# Patient Record
Sex: FEMALE | Race: WHITE | Employment: FULL TIME | URBAN - NONMETROPOLITAN AREA
[De-identification: names, ages, dates, MRNs, and addresses within clinical notes are randomized per-mention and may not be internally consistent; named-entity substitution may affect disease eponyms.]

---

## 2017-01-30 ENCOUNTER — OFFICE VISIT (OUTPATIENT)
Dept: FAMILY MEDICINE CLINIC | Age: 32
End: 2017-01-30

## 2017-01-30 VITALS
TEMPERATURE: 97.1 F | WEIGHT: 156.4 LBS | RESPIRATION RATE: 18 BRPM | SYSTOLIC BLOOD PRESSURE: 128 MMHG | DIASTOLIC BLOOD PRESSURE: 70 MMHG | BODY MASS INDEX: 28.61 KG/M2 | HEART RATE: 76 BPM

## 2017-01-30 DIAGNOSIS — J02.9 SORE THROAT: ICD-10-CM

## 2017-01-30 DIAGNOSIS — J02.8 PHARYNGITIS DUE TO OTHER ORGANISM: Primary | ICD-10-CM

## 2017-01-30 LAB
HETEROPHILE ANTIBODIES: NEGATIVE
S PYO AG THROAT QL: NORMAL

## 2017-01-30 PROCEDURE — 87880 STREP A ASSAY W/OPTIC: CPT | Performed by: FAMILY MEDICINE

## 2017-01-30 PROCEDURE — 86308 HETEROPHILE ANTIBODY SCREEN: CPT | Performed by: FAMILY MEDICINE

## 2017-01-30 PROCEDURE — 99213 OFFICE O/P EST LOW 20 MIN: CPT | Performed by: FAMILY MEDICINE

## 2017-01-30 RX ORDER — AZITHROMYCIN 250 MG/1
TABLET, FILM COATED ORAL
Qty: 1 PACKET | Refills: 0 | Status: SHIPPED | OUTPATIENT
Start: 2017-01-30 | End: 2017-02-09 | Stop reason: ALTCHOICE

## 2017-01-30 RX ORDER — METHYLPREDNISOLONE 4 MG/1
TABLET ORAL
Qty: 1 KIT | Refills: 0 | Status: SHIPPED | OUTPATIENT
Start: 2017-01-30 | End: 2017-02-05

## 2017-01-30 ASSESSMENT — PATIENT HEALTH QUESTIONNAIRE - PHQ9
SUM OF ALL RESPONSES TO PHQ QUESTIONS 1-9: 0
1. LITTLE INTEREST OR PLEASURE IN DOING THINGS: 0
2. FEELING DOWN, DEPRESSED OR HOPELESS: 0
SUM OF ALL RESPONSES TO PHQ9 QUESTIONS 1 & 2: 0

## 2017-02-09 ENCOUNTER — OFFICE VISIT (OUTPATIENT)
Dept: FAMILY MEDICINE CLINIC | Age: 32
End: 2017-02-09

## 2017-02-09 VITALS
SYSTOLIC BLOOD PRESSURE: 122 MMHG | RESPIRATION RATE: 16 BRPM | DIASTOLIC BLOOD PRESSURE: 80 MMHG | WEIGHT: 155.4 LBS | BODY MASS INDEX: 28.42 KG/M2 | HEART RATE: 74 BPM

## 2017-02-09 DIAGNOSIS — R10.13 EPIGASTRIC PAIN: ICD-10-CM

## 2017-02-09 DIAGNOSIS — R10.12 LUQ PAIN: Primary | ICD-10-CM

## 2017-02-09 DIAGNOSIS — R14.0 ABDOMINAL DISTENSION: ICD-10-CM

## 2017-02-09 PROCEDURE — 99213 OFFICE O/P EST LOW 20 MIN: CPT | Performed by: FAMILY MEDICINE

## 2017-03-22 ENCOUNTER — OFFICE VISIT (OUTPATIENT)
Dept: FAMILY MEDICINE CLINIC | Age: 32
End: 2017-03-22

## 2017-03-22 VITALS
WEIGHT: 152 LBS | SYSTOLIC BLOOD PRESSURE: 124 MMHG | OXYGEN SATURATION: 98 % | HEART RATE: 100 BPM | DIASTOLIC BLOOD PRESSURE: 82 MMHG | RESPIRATION RATE: 8 BRPM | TEMPERATURE: 98.5 F | BODY MASS INDEX: 27.8 KG/M2

## 2017-03-22 DIAGNOSIS — J02.0 STREP PHARYNGITIS: Primary | ICD-10-CM

## 2017-03-22 DIAGNOSIS — J02.9 SORE THROAT: ICD-10-CM

## 2017-03-22 LAB — S PYO AG THROAT QL: POSITIVE

## 2017-03-22 PROCEDURE — 99213 OFFICE O/P EST LOW 20 MIN: CPT | Performed by: FAMILY MEDICINE

## 2017-03-22 PROCEDURE — 87880 STREP A ASSAY W/OPTIC: CPT | Performed by: FAMILY MEDICINE

## 2017-03-22 PROCEDURE — 96372 THER/PROPH/DIAG INJ SC/IM: CPT | Performed by: FAMILY MEDICINE

## 2017-03-22 RX ORDER — CEFTRIAXONE 1 G/1
1 INJECTION, POWDER, FOR SOLUTION INTRAMUSCULAR; INTRAVENOUS ONCE
Status: COMPLETED | OUTPATIENT
Start: 2017-03-22 | End: 2017-03-22

## 2017-03-22 RX ADMIN — CEFTRIAXONE 1 G: 1 INJECTION, POWDER, FOR SOLUTION INTRAMUSCULAR; INTRAVENOUS at 11:15

## 2017-03-24 ENCOUNTER — TELEPHONE (OUTPATIENT)
Dept: FAMILY MEDICINE CLINIC | Age: 32
End: 2017-03-24

## 2017-03-24 RX ORDER — CEFDINIR 300 MG/1
300 CAPSULE ORAL 2 TIMES DAILY
Qty: 20 CAPSULE | Refills: 0 | Status: SHIPPED | OUTPATIENT
Start: 2017-03-24 | End: 2017-04-03

## 2017-07-19 ENCOUNTER — HOSPITAL ENCOUNTER (OUTPATIENT)
Age: 32
Discharge: HOME | End: 2017-07-19
Payer: COMMERCIAL

## 2017-07-19 LAB — PROGESTERONE LEVEL: 19.97 NG/ML

## 2017-07-19 PROCEDURE — 84144 ASSAY OF PROGESTERONE: CPT

## 2017-07-19 PROCEDURE — 36415 COLL VENOUS BLD VENIPUNCTURE: CPT

## 2017-08-15 ENCOUNTER — HOSPITAL ENCOUNTER (OUTPATIENT)
Age: 32
Discharge: HOME | End: 2017-08-15
Payer: COMMERCIAL

## 2017-08-15 LAB — PROGESTERONE LEVEL: 10.91 NG/ML

## 2017-08-15 PROCEDURE — 84144 ASSAY OF PROGESTERONE: CPT

## 2017-08-15 PROCEDURE — 36415 COLL VENOUS BLD VENIPUNCTURE: CPT

## 2017-09-29 ENCOUNTER — TELEPHONE (OUTPATIENT)
Dept: FAMILY MEDICINE CLINIC | Age: 32
End: 2017-09-29

## 2017-09-29 ENCOUNTER — NURSE ONLY (OUTPATIENT)
Dept: FAMILY MEDICINE CLINIC | Age: 32
End: 2017-09-29
Payer: COMMERCIAL

## 2017-09-29 VITALS — TEMPERATURE: 98.6 F

## 2017-09-29 DIAGNOSIS — J02.0 STREPTOCOCCAL SORE THROAT: ICD-10-CM

## 2017-09-29 DIAGNOSIS — J02.9 SORE THROAT: Primary | ICD-10-CM

## 2017-09-29 LAB — S PYO AG THROAT QL: POSITIVE

## 2017-09-29 PROCEDURE — 87880 STREP A ASSAY W/OPTIC: CPT | Performed by: FAMILY MEDICINE

## 2017-09-29 PROCEDURE — 96372 THER/PROPH/DIAG INJ SC/IM: CPT | Performed by: FAMILY MEDICINE

## 2017-09-29 PROCEDURE — 99211 OFF/OP EST MAY X REQ PHY/QHP: CPT | Performed by: FAMILY MEDICINE

## 2017-10-05 ENCOUNTER — TELEPHONE (OUTPATIENT)
Dept: FAMILY MEDICINE CLINIC | Age: 32
End: 2017-10-05

## 2017-10-05 RX ORDER — AMOXICILLIN 500 MG/1
500 CAPSULE ORAL 2 TIMES DAILY
Qty: 20 CAPSULE | Refills: 0 | Status: SHIPPED | OUTPATIENT
Start: 2017-10-05 | End: 2017-10-15

## 2017-10-05 NOTE — TELEPHONE ENCOUNTER
(Answered message for Dr. La Suarez while she's at nursing home)    Script for amoxicillin sent to pharmacy. Also make sure pushing fluids and can do salt water gargles several times per day and take ibuprofen or aleve for pain. Make appt if sx persist/worsen.     Call pt

## 2017-10-16 ENCOUNTER — OFFICE VISIT (OUTPATIENT)
Dept: FAMILY MEDICINE CLINIC | Age: 32
End: 2017-10-16
Payer: COMMERCIAL

## 2017-10-16 VITALS
DIASTOLIC BLOOD PRESSURE: 72 MMHG | RESPIRATION RATE: 16 BRPM | SYSTOLIC BLOOD PRESSURE: 130 MMHG | TEMPERATURE: 98.2 F | HEART RATE: 78 BPM | BODY MASS INDEX: 28.17 KG/M2 | WEIGHT: 154 LBS

## 2017-10-16 DIAGNOSIS — J40 BRONCHITIS: Primary | ICD-10-CM

## 2017-10-16 DIAGNOSIS — J01.01 ACUTE RECURRENT MAXILLARY SINUSITIS: ICD-10-CM

## 2017-10-16 PROCEDURE — 99213 OFFICE O/P EST LOW 20 MIN: CPT | Performed by: FAMILY MEDICINE

## 2017-10-16 RX ORDER — METHYLPREDNISOLONE 4 MG/1
TABLET ORAL
Qty: 1 KIT | Refills: 0 | Status: SHIPPED | OUTPATIENT
Start: 2017-10-16 | End: 2018-09-27 | Stop reason: SDUPTHER

## 2017-10-16 RX ORDER — LEVOFLOXACIN 500 MG/1
500 TABLET, FILM COATED ORAL DAILY
Qty: 10 TABLET | Refills: 0 | Status: SHIPPED | OUTPATIENT
Start: 2017-10-16 | End: 2017-10-26

## 2017-10-16 RX ORDER — FLUCONAZOLE 150 MG/1
150 TABLET ORAL DAILY
Qty: 2 TABLET | Refills: 0 | Status: SHIPPED | OUTPATIENT
Start: 2017-10-16 | End: 2017-10-18

## 2017-10-16 NOTE — PROGRESS NOTES
Subjective:      Patient ID: Rocky Odell is a 28 y.o. female. HPI  Chief Complaint   Patient presents with    Cough     completed amoxicillin yesterday 10-15-17    Sinusitis       Here for cough and congestion getting worse. She was treated twice for strep on 9/29/17 and 10/5/17 with bicillin and amoxicillin. Associated with chest congestion, vaginal yeast symptoms, and lower appetite. Tried:  Dayquil, zyrtec D, cough drops and little relief. Review of Systems  Constitutional: Negative for fever, chills, diaphoresis, activity change, appetite change and fatigue. HENT: Negative for hearing loss, ear pain, congestion, sore throat, rhinorrhea, postnasal drip and ear discharge. Eyes: Negative for photophobia and visual disturbance. Respiratory: Positive for cough, chest tightness, shortness of breath and wheezing. Cardiovascular: Negative for chest pain and leg swelling. Gastrointestinal: Negative for nausea, vomiting, abdominal pain, diarrhea and constipation. Genitourinary: Negative for dysuria, urgency and frequency. Neurological: Negative for weakness, light-headedness and headaches. Psychiatric/Behavioral: Negative for sleep disturbance. Objective:   Physical Exam  Vitals:    10/16/17 1050   BP: 130/72   Pulse: 78   Resp: 16   Temp: 98.2 °F (36.8 °C)     Wt Readings from Last 3 Encounters:   10/16/17 154 lb (69.9 kg)   03/22/17 152 lb (68.9 kg)   02/09/17 155 lb 6.4 oz (70.5 kg)     Physical Exam   Constitutional: Vital signs are normal. She appears well-developed and well-nourished. She is active. HENT:   Head: Normocephalic and atraumatic. Right Ear: Tympanic membrane, external ear and ear canal normal. No drainage or tenderness. Left Ear: Tympanic membrane, external ear and ear canal normal. No drainage or tenderness. Nose: Nose normal. No mucosal edema or rhinorrhea.    Mouth/Throat: Uvula is midline, oropharynx is clear and moist and mucous membranes are normal. Mucous membranes are not pale. Normal dentition. No posterior oropharyngeal edema or posterior oropharyngeal erythema. Eyes: Lids are normal. Right eye exhibits no chemosis and no discharge. Left eye exhibits no chemosis and no drainage. Right conjunctiva has no hemorrhage. Left conjunctiva has no hemorrhage. Right eye exhibits normal extraocular motion. Left eye exhibits normal extraocular motion. Right pupil is round and reactive. Left pupil is round and reactive. Pupils are equal.   Cardiovascular: Normal rate, regular rhythm, S1 normal, S2 normal and normal heart sounds. Exam reveals no gallop. No murmur heard. Pulmonary/Chest: Effort normal and breath sounds normal. No respiratory distress. She has no wheezes. She has no rhonchi. She has no rales. Abdominal: Soft. Normal appearance and bowel sounds are normal. She exhibits no distension and no mass. There is no hepatosplenomegaly. No tenderness. She has no rigidity, no rebound and no guarding. No hernia. Musculoskeletal:        Right lower leg: She exhibits no edema. Left lower leg: She exhibits no edema. Neurological: She is alert. Assessment:      Timbo Lucas was seen today for cough and sinusitis. Diagnoses and all orders for this visit:    Bronchitis  -     methylPREDNISolone (MEDROL DOSEPACK) 4 MG tablet; Take by mouth. -     levofloxacin (LEVAQUIN) 500 MG tablet; Take 1 tablet by mouth daily for 10 days  -     fluconazole (DIFLUCAN) 150 MG tablet; Take 1 tablet by mouth daily for 2 days 1 week apart    Acute recurrent maxillary sinusitis    continue with allegra and flonase and saline nasal spray    Push fluids  Tylenol or ibuprofen prn fever  Cool mist Humidifier in the bedroom  Follow up if not better in 1 week or if symptoms get worse.

## 2017-11-13 ENCOUNTER — OFFICE VISIT (OUTPATIENT)
Dept: FAMILY MEDICINE CLINIC | Age: 32
End: 2017-11-13
Payer: COMMERCIAL

## 2017-11-13 VITALS
DIASTOLIC BLOOD PRESSURE: 84 MMHG | TEMPERATURE: 98 F | HEIGHT: 62 IN | SYSTOLIC BLOOD PRESSURE: 122 MMHG | RESPIRATION RATE: 16 BRPM | WEIGHT: 154.6 LBS | HEART RATE: 88 BPM | BODY MASS INDEX: 28.45 KG/M2

## 2017-11-13 DIAGNOSIS — J32.0 CHRONIC MAXILLARY SINUSITIS: ICD-10-CM

## 2017-11-13 DIAGNOSIS — J02.9 SORE THROAT: Primary | ICD-10-CM

## 2017-11-13 LAB — S PYO AG THROAT QL: NORMAL

## 2017-11-13 PROCEDURE — G8419 CALC BMI OUT NRM PARAM NOF/U: HCPCS | Performed by: NURSE PRACTITIONER

## 2017-11-13 PROCEDURE — G8427 DOCREV CUR MEDS BY ELIG CLIN: HCPCS | Performed by: NURSE PRACTITIONER

## 2017-11-13 PROCEDURE — 99213 OFFICE O/P EST LOW 20 MIN: CPT | Performed by: NURSE PRACTITIONER

## 2017-11-13 PROCEDURE — 87880 STREP A ASSAY W/OPTIC: CPT | Performed by: NURSE PRACTITIONER

## 2017-11-13 PROCEDURE — G8484 FLU IMMUNIZE NO ADMIN: HCPCS | Performed by: NURSE PRACTITIONER

## 2017-11-13 PROCEDURE — 1036F TOBACCO NON-USER: CPT | Performed by: NURSE PRACTITIONER

## 2017-11-13 RX ORDER — PSEUDOEPHEDRINE HCL 120 MG/1
120 TABLET, FILM COATED, EXTENDED RELEASE ORAL EVERY 12 HOURS PRN
COMMUNITY
End: 2018-11-07 | Stop reason: ALTCHOICE

## 2017-11-13 RX ORDER — TRIAMCINOLONE ACETONIDE 1 MG/G
CREAM TOPICAL
Qty: 45 G | Refills: 1 | Status: SHIPPED | OUTPATIENT
Start: 2017-11-13 | End: 2018-08-29 | Stop reason: SDUPTHER

## 2017-11-13 ASSESSMENT — ENCOUNTER SYMPTOMS
SORE THROAT: 1
PHOTOPHOBIA: 0
SINUS PRESSURE: 1
BACK PAIN: 0
SHORTNESS OF BREATH: 0
NAUSEA: 0
DIARRHEA: 0
VOMITING: 0
CONSTIPATION: 0
ABDOMINAL PAIN: 0
COLOR CHANGE: 0
EYE DISCHARGE: 0
RHINORRHEA: 0
WHEEZING: 0
COUGH: 0

## 2017-11-20 ENCOUNTER — HOSPITAL ENCOUNTER (OUTPATIENT)
Dept: CT IMAGING | Age: 32
Discharge: HOME | End: 2017-11-20
Payer: COMMERCIAL

## 2017-11-20 DIAGNOSIS — J32.0 CHRONIC MAXILLARY SINUSITIS: ICD-10-CM

## 2017-11-20 PROCEDURE — 70486 CT MAXILLOFACIAL W/O DYE: CPT

## 2017-11-21 ENCOUNTER — TELEPHONE (OUTPATIENT)
Dept: FAMILY MEDICINE CLINIC | Age: 32
End: 2017-11-21

## 2017-11-21 NOTE — TELEPHONE ENCOUNTER
Spoke with patient aware of results. Stated she would like to go to [facility] message at Dr. Nano Lopez office to call back to schedule appointment for patient. Patient is currently at [address] to leave voicemail.

## 2018-01-30 ENCOUNTER — OFFICE VISIT (OUTPATIENT)
Dept: FAMILY MEDICINE CLINIC | Age: 33
End: 2018-01-30
Payer: COMMERCIAL

## 2018-01-30 VITALS
DIASTOLIC BLOOD PRESSURE: 68 MMHG | HEIGHT: 62 IN | RESPIRATION RATE: 12 BRPM | SYSTOLIC BLOOD PRESSURE: 120 MMHG | WEIGHT: 156.8 LBS | BODY MASS INDEX: 28.85 KG/M2 | HEART RATE: 72 BPM

## 2018-01-30 DIAGNOSIS — G43.111 INTRACTABLE MIGRAINE WITH AURA WITH STATUS MIGRAINOSUS: Primary | ICD-10-CM

## 2018-01-30 DIAGNOSIS — K58.0 IRRITABLE BOWEL SYNDROME WITH DIARRHEA: ICD-10-CM

## 2018-01-30 PROCEDURE — G8419 CALC BMI OUT NRM PARAM NOF/U: HCPCS | Performed by: FAMILY MEDICINE

## 2018-01-30 PROCEDURE — 99214 OFFICE O/P EST MOD 30 MIN: CPT | Performed by: FAMILY MEDICINE

## 2018-01-30 PROCEDURE — G8427 DOCREV CUR MEDS BY ELIG CLIN: HCPCS | Performed by: FAMILY MEDICINE

## 2018-01-30 PROCEDURE — 1036F TOBACCO NON-USER: CPT | Performed by: FAMILY MEDICINE

## 2018-01-30 PROCEDURE — G8484 FLU IMMUNIZE NO ADMIN: HCPCS | Performed by: FAMILY MEDICINE

## 2018-01-30 RX ORDER — BUTALBITAL, ACETAMINOPHEN, CAFFEINE AND CODEINE PHOSPHATE 300; 50; 40; 30 MG/1; MG/1; MG/1; MG/1
1 CAPSULE ORAL EVERY 4 HOURS PRN
Qty: 30 CAPSULE | Refills: 0 | Status: SHIPPED | OUTPATIENT
Start: 2018-01-30 | End: 2018-02-06

## 2018-01-30 RX ORDER — PROMETHAZINE HYDROCHLORIDE 25 MG/1
25 TABLET ORAL EVERY 6 HOURS PRN
Qty: 30 TABLET | Refills: 5 | Status: SHIPPED | OUTPATIENT
Start: 2018-01-30 | End: 2019-05-15 | Stop reason: SDUPTHER

## 2018-01-30 RX ORDER — PROPRANOLOL HCL 60 MG
60 CAPSULE, EXTENDED RELEASE 24HR ORAL DAILY
Qty: 30 CAPSULE | Refills: 3 | Status: SHIPPED | OUTPATIENT
Start: 2018-01-30 | End: 2018-06-11 | Stop reason: SDUPTHER

## 2018-01-30 RX ORDER — CYCLOBENZAPRINE HCL 10 MG
10 TABLET ORAL 3 TIMES DAILY PRN
Qty: 90 TABLET | Refills: 1 | Status: SHIPPED | OUTPATIENT
Start: 2018-01-30 | End: 2018-11-07 | Stop reason: ALTCHOICE

## 2018-01-30 NOTE — PROGRESS NOTES
Tympanic membrane, external ear and ear canal normal. No drainage or tenderness. Left Ear: Tympanic membrane, external ear and ear canal normal. No drainage or tenderness. Nose: Nose normal. No mucosal edema or rhinorrhea. Mouth/Throat: Uvula is midline, oropharynx is clear and moist and mucous membranes are normal. Mucous membranes are not pale. Normal dentition. No posterior oropharyngeal edema or posterior oropharyngeal erythema. Eyes: Lids are normal. Right eye exhibits no chemosis and no discharge. Left eye exhibits no chemosis and no drainage. Right conjunctiva has no hemorrhage. Left conjunctiva has no hemorrhage. Right eye exhibits normal extraocular motion. Left eye exhibits normal extraocular motion. Right pupil is round and reactive. Left pupil is round and reactive. Pupils are equal.   Cardiovascular: Normal rate, regular rhythm, S1 normal, S2 normal and normal heart sounds. Exam reveals no gallop. No murmur heard. Pulmonary/Chest: Effort normal and breath sounds normal. No respiratory distress. She has no wheezes. She has no rhonchi. She has no rales. Abdominal: Soft. Normal appearance and bowel sounds are normal. She exhibits no distension and no mass. There is no hepatosplenomegaly. No tenderness. She has no rigidity, no rebound and no guarding. No hernia. Musculoskeletal:        Right lower leg: She exhibits no edema. Left lower leg: She exhibits no edema. Neurological: She is alert. Assessment:      Ernst Awad was seen today for headache and medication refill. Diagnoses and all orders for this visit:    Intractable migraine with aura with status migrainosus  -     butalbital-acetaminophen-caffeine-codeine (FIORICET WITH CODEINE) -25-30 MG per capsule; Take 1 capsule by mouth every 4 hours as needed (migraine) for up to 10 days. -     promethazine (PHENERGAN) 25 MG tablet;  Take 1 tablet by mouth every 6 hours as needed for Nausea  -     propranolol (INDERAL LA) 60 MG extended release capsule; Take 1 capsule by mouth daily  -     MRI Brain WO Contrast; Future  -     CBC; Future  -     Comprehensive Metabolic Panel; Future  -     TSH With Reflex Ft4; Future  -     Sedimentation Rate; Future  -     Prolactin; Future    Irritable bowel syndrome with diarrhea  -     cyclobenzaprine (FLEXERIL) 10 MG tablet; Take 1 tablet by mouth 3 times daily as needed for Muscle spasms  -     MRI Brain WO Contrast; Future  -     CBC; Future  -     Comprehensive Metabolic Panel; Future  -     TSH With Reflex Ft4; Future  -     Sedimentation Rate; Future  -     Prolactin; Future      Headache diary    Healthy diet and avoid fatty and spicy foods    Imodium prn    Call or return to clinic prn if these symptoms worsen or fail to improve as anticipated.

## 2018-01-31 ENCOUNTER — TELEPHONE (OUTPATIENT)
Dept: FAMILY MEDICINE CLINIC | Age: 33
End: 2018-01-31

## 2018-01-31 DIAGNOSIS — G43.111 INTRACTABLE MIGRAINE WITH AURA WITH STATUS MIGRAINOSUS: Primary | ICD-10-CM

## 2018-02-01 ENCOUNTER — NURSE ONLY (OUTPATIENT)
Dept: FAMILY MEDICINE CLINIC | Age: 33
End: 2018-02-01
Payer: COMMERCIAL

## 2018-02-01 DIAGNOSIS — K58.0 IRRITABLE BOWEL SYNDROME WITH DIARRHEA: ICD-10-CM

## 2018-02-01 DIAGNOSIS — G43.111 INTRACTABLE MIGRAINE WITH AURA WITH STATUS MIGRAINOSUS: ICD-10-CM

## 2018-02-01 LAB
ALBUMIN SERPL-MCNC: 4.5 G/DL (ref 3.5–5.1)
ALP BLD-CCNC: 38 U/L (ref 38–126)
ALT SERPL-CCNC: 17 U/L (ref 11–66)
ANION GAP SERPL CALCULATED.3IONS-SCNC: 13 MEQ/L (ref 8–16)
AST SERPL-CCNC: 14 U/L (ref 5–40)
BILIRUB SERPL-MCNC: 0.3 MG/DL (ref 0.3–1.2)
BUN BLDV-MCNC: 12 MG/DL (ref 7–22)
CALCIUM SERPL-MCNC: 9 MG/DL (ref 8.5–10.5)
CHLORIDE BLD-SCNC: 103 MEQ/L (ref 98–111)
CO2: 24 MEQ/L (ref 23–33)
CREAT SERPL-MCNC: 0.7 MG/DL (ref 0.4–1.2)
GFR SERPL CREATININE-BSD FRML MDRD: > 90 ML/MIN/1.73M2
GLUCOSE BLD-MCNC: 95 MG/DL (ref 70–108)
HCT VFR BLD CALC: 38.9 % (ref 37–47)
HEMOGLOBIN: 13 GM/DL (ref 12–16)
MCH RBC QN AUTO: 27.4 PG (ref 27–31)
MCHC RBC AUTO-ENTMCNC: 33.4 GM/DL (ref 33–37)
MCV RBC AUTO: 82.1 FL (ref 81–99)
PDW BLD-RTO: 13.8 % (ref 11.5–14.5)
PLATELET # BLD: 266 THOU/MM3 (ref 130–400)
PMV BLD AUTO: 9.6 FL (ref 7.4–10.4)
POTASSIUM SERPL-SCNC: 4.5 MEQ/L (ref 3.5–5.2)
PROLACTIN: 9 NG/ML
RBC # BLD: 4.74 MILL/MM3 (ref 4.2–5.4)
SEDIMENTATION RATE, ERYTHROCYTE: 5 MM/HR (ref 0–20)
SODIUM BLD-SCNC: 140 MEQ/L (ref 135–145)
TOTAL PROTEIN: 7.1 G/DL (ref 6.1–8)
TSH SERPL DL<=0.05 MIU/L-ACNC: 1.26 UIU/ML (ref 0.4–4.2)
WBC # BLD: 6.1 THOU/MM3 (ref 4.8–10.8)

## 2018-02-01 PROCEDURE — 36415 COLL VENOUS BLD VENIPUNCTURE: CPT | Performed by: FAMILY MEDICINE

## 2018-02-06 ENCOUNTER — HOSPITAL ENCOUNTER (OUTPATIENT)
Dept: MRI IMAGING | Age: 33
Discharge: HOME | End: 2018-02-06
Payer: COMMERCIAL

## 2018-02-06 DIAGNOSIS — G43.111 INTRACTABLE MIGRAINE WITH AURA WITH STATUS MIGRAINOSUS: ICD-10-CM

## 2018-02-06 PROCEDURE — A9579 GAD-BASE MR CONTRAST NOS,1ML: HCPCS | Performed by: FAMILY MEDICINE

## 2018-02-06 PROCEDURE — 70553 MRI BRAIN STEM W/O & W/DYE: CPT

## 2018-02-06 PROCEDURE — 6360000004 HC RX CONTRAST MEDICATION: Performed by: FAMILY MEDICINE

## 2018-02-06 RX ADMIN — GADOTERIDOL 15 ML: 279.3 INJECTION, SOLUTION INTRAVENOUS at 12:42

## 2018-02-28 ENCOUNTER — OFFICE VISIT (OUTPATIENT)
Dept: FAMILY MEDICINE CLINIC | Age: 33
End: 2018-02-28
Payer: COMMERCIAL

## 2018-02-28 VITALS
HEIGHT: 62 IN | RESPIRATION RATE: 16 BRPM | WEIGHT: 156 LBS | BODY MASS INDEX: 28.71 KG/M2 | HEART RATE: 76 BPM | TEMPERATURE: 97.4 F | SYSTOLIC BLOOD PRESSURE: 132 MMHG | DIASTOLIC BLOOD PRESSURE: 86 MMHG

## 2018-02-28 DIAGNOSIS — G43.109 MIGRAINE WITH AURA AND WITHOUT STATUS MIGRAINOSUS, NOT INTRACTABLE: Primary | ICD-10-CM

## 2018-02-28 DIAGNOSIS — J02.9 ACUTE PHARYNGITIS, UNSPECIFIED ETIOLOGY: ICD-10-CM

## 2018-02-28 DIAGNOSIS — J02.9 SORE THROAT: ICD-10-CM

## 2018-02-28 LAB — STREPTOCOCCUS A RNA: NORMAL

## 2018-02-28 PROCEDURE — 1036F TOBACCO NON-USER: CPT | Performed by: FAMILY MEDICINE

## 2018-02-28 PROCEDURE — G8484 FLU IMMUNIZE NO ADMIN: HCPCS | Performed by: FAMILY MEDICINE

## 2018-02-28 PROCEDURE — G8427 DOCREV CUR MEDS BY ELIG CLIN: HCPCS | Performed by: FAMILY MEDICINE

## 2018-02-28 PROCEDURE — G8419 CALC BMI OUT NRM PARAM NOF/U: HCPCS | Performed by: FAMILY MEDICINE

## 2018-02-28 PROCEDURE — 99214 OFFICE O/P EST MOD 30 MIN: CPT | Performed by: FAMILY MEDICINE

## 2018-02-28 PROCEDURE — 87651 STREP A DNA AMP PROBE: CPT | Performed by: FAMILY MEDICINE

## 2018-02-28 RX ORDER — AZITHROMYCIN 250 MG/1
TABLET, FILM COATED ORAL
Qty: 1 PACKET | Refills: 0 | Status: SHIPPED | OUTPATIENT
Start: 2018-02-28 | End: 2018-08-29 | Stop reason: ALTCHOICE

## 2018-02-28 ASSESSMENT — PATIENT HEALTH QUESTIONNAIRE - PHQ9
2. FEELING DOWN, DEPRESSED OR HOPELESS: 0
SUM OF ALL RESPONSES TO PHQ QUESTIONS 1-9: 0
1. LITTLE INTEREST OR PLEASURE IN DOING THINGS: 0
SUM OF ALL RESPONSES TO PHQ9 QUESTIONS 1 & 2: 0

## 2018-02-28 NOTE — PROGRESS NOTES
oropharyngeal erythema. Eyes: Lids are normal. Right eye exhibits no chemosis and no discharge. Left eye exhibits no chemosis and no drainage. Right conjunctiva has no hemorrhage. Left conjunctiva has no hemorrhage. Right eye exhibits normal extraocular motion. Left eye exhibits normal extraocular motion. Right pupil is round and reactive. Left pupil is round and reactive. Pupils are equal.   Cardiovascular: Normal rate, regular rhythm, S1 normal, S2 normal and normal heart sounds. Exam reveals no gallop. No murmur heard. Pulmonary/Chest: Effort normal and breath sounds normal. No respiratory distress. She has no wheezes. She has no rhonchi. She has no rales. Abdominal: Soft. Normal appearance and bowel sounds are normal. She exhibits no distension and no mass. There is no hepatosplenomegaly. No tenderness. She has no rigidity, no rebound and no guarding. No hernia. Musculoskeletal:        Right lower leg: She exhibits no edema. Left lower leg: She exhibits no edema. Neurological: She is alert. No results found for this visit on 02/28/18. Strep negative    Assessment:      Vidya Cancer was seen today for 1 month follow-up, cough, hoarse and pharyngitis. Diagnoses and all orders for this visit:    Migraine with aura and without status migrainosus, not intractable    Sore throat  -     POCT Rapid Strep A DNA    Acute pharyngitis, unspecified etiology  -     azithromycin (ZITHROMAX) 250 MG tablet; Take 2 tabs (500 mg) on Day 1, and take 1 tab (250 mg) on days 2 through 5. Push fluids  Tylenol or ibuprofen prn fever  Cool mist Humidifier in the bedroom  Follow up if not better in 1 week or if symptoms get worse.

## 2018-06-11 DIAGNOSIS — G43.111 INTRACTABLE MIGRAINE WITH AURA WITH STATUS MIGRAINOSUS: ICD-10-CM

## 2018-06-11 RX ORDER — PROPRANOLOL HCL 60 MG
60 CAPSULE, EXTENDED RELEASE 24HR ORAL DAILY
Qty: 30 CAPSULE | Refills: 3 | Status: SHIPPED | OUTPATIENT
Start: 2018-06-11 | End: 2018-10-15 | Stop reason: SDUPTHER

## 2018-07-24 ENCOUNTER — NURSE ONLY (OUTPATIENT)
Dept: FAMILY MEDICINE CLINIC | Age: 33
End: 2018-07-24
Payer: COMMERCIAL

## 2018-07-24 DIAGNOSIS — R30.0 DYSURIA: Primary | ICD-10-CM

## 2018-07-24 DIAGNOSIS — R82.998 LEUKOCYTES IN URINE: ICD-10-CM

## 2018-07-24 LAB
BILIRUBIN URINE: NEGATIVE
BLOOD URINE, POC: NEGATIVE
CHARACTER, URINE: CLEAR
COLOR, URINE: YELLOW
GLUCOSE URINE: NEGATIVE MG/DL
KETONES, URINE: NEGATIVE
LEUKOCYTE CLUMPS, URINE: ABNORMAL
NITRITE, URINE: NEGATIVE
PH, URINE: 6.5
PROTEIN, URINE: NEGATIVE MG/DL
SPECIFIC GRAVITY, URINE: 1.01 (ref 1–1.03)
UROBILINOGEN, URINE: 0.2 EU/DL

## 2018-07-24 PROCEDURE — 99211 OFF/OP EST MAY X REQ PHY/QHP: CPT | Performed by: FAMILY MEDICINE

## 2018-07-24 PROCEDURE — 81003 URINALYSIS AUTO W/O SCOPE: CPT | Performed by: FAMILY MEDICINE

## 2018-07-25 ENCOUNTER — TELEPHONE (OUTPATIENT)
Dept: FAMILY MEDICINE CLINIC | Age: 33
End: 2018-07-25

## 2018-07-25 RX ORDER — CIPROFLOXACIN 500 MG/1
500 TABLET, FILM COATED ORAL 2 TIMES DAILY
Qty: 20 TABLET | Refills: 0 | Status: SHIPPED | OUTPATIENT
Start: 2018-07-25 | End: 2018-08-04

## 2018-07-25 NOTE — TELEPHONE ENCOUNTER
Bacteria on the urine culture  cipro sent to the pharmacy  Make sure she is not pregnant before starting cipro  Call patient

## 2018-07-26 LAB
ORGANISM: ABNORMAL
URINE CULTURE, ROUTINE: ABNORMAL

## 2018-08-29 ENCOUNTER — OFFICE VISIT (OUTPATIENT)
Dept: FAMILY MEDICINE CLINIC | Age: 33
End: 2018-08-29
Payer: COMMERCIAL

## 2018-08-29 VITALS
SYSTOLIC BLOOD PRESSURE: 124 MMHG | WEIGHT: 156.6 LBS | TEMPERATURE: 97 F | RESPIRATION RATE: 14 BRPM | HEART RATE: 60 BPM | OXYGEN SATURATION: 97 % | HEIGHT: 62 IN | BODY MASS INDEX: 28.82 KG/M2 | DIASTOLIC BLOOD PRESSURE: 72 MMHG

## 2018-08-29 DIAGNOSIS — G43.109 MIGRAINE WITH AURA AND WITHOUT STATUS MIGRAINOSUS, NOT INTRACTABLE: Primary | ICD-10-CM

## 2018-08-29 DIAGNOSIS — L30.1 DYSHIDROSIS: ICD-10-CM

## 2018-08-29 PROCEDURE — 99213 OFFICE O/P EST LOW 20 MIN: CPT | Performed by: FAMILY MEDICINE

## 2018-08-29 PROCEDURE — G8427 DOCREV CUR MEDS BY ELIG CLIN: HCPCS | Performed by: FAMILY MEDICINE

## 2018-08-29 PROCEDURE — G8419 CALC BMI OUT NRM PARAM NOF/U: HCPCS | Performed by: FAMILY MEDICINE

## 2018-08-29 PROCEDURE — 1036F TOBACCO NON-USER: CPT | Performed by: FAMILY MEDICINE

## 2018-08-29 RX ORDER — TRIAMCINOLONE ACETONIDE 1 MG/G
CREAM TOPICAL
Qty: 45 G | Refills: 1 | Status: SHIPPED | OUTPATIENT
Start: 2018-08-29 | End: 2018-11-07 | Stop reason: ALTCHOICE

## 2018-09-27 ENCOUNTER — OFFICE VISIT (OUTPATIENT)
Dept: FAMILY MEDICINE CLINIC | Age: 33
End: 2018-09-27
Payer: COMMERCIAL

## 2018-09-27 VITALS
HEIGHT: 62 IN | BODY MASS INDEX: 29 KG/M2 | TEMPERATURE: 97.6 F | DIASTOLIC BLOOD PRESSURE: 64 MMHG | HEART RATE: 76 BPM | SYSTOLIC BLOOD PRESSURE: 106 MMHG | WEIGHT: 157.6 LBS | OXYGEN SATURATION: 99 % | RESPIRATION RATE: 16 BRPM

## 2018-09-27 DIAGNOSIS — J40 BRONCHITIS: ICD-10-CM

## 2018-09-27 DIAGNOSIS — J02.9 ACUTE PHARYNGITIS, UNSPECIFIED ETIOLOGY: ICD-10-CM

## 2018-09-27 PROCEDURE — G8427 DOCREV CUR MEDS BY ELIG CLIN: HCPCS | Performed by: NURSE PRACTITIONER

## 2018-09-27 PROCEDURE — 99213 OFFICE O/P EST LOW 20 MIN: CPT | Performed by: NURSE PRACTITIONER

## 2018-09-27 PROCEDURE — 1036F TOBACCO NON-USER: CPT | Performed by: NURSE PRACTITIONER

## 2018-09-27 PROCEDURE — G8419 CALC BMI OUT NRM PARAM NOF/U: HCPCS | Performed by: NURSE PRACTITIONER

## 2018-09-27 RX ORDER — AZITHROMYCIN 250 MG/1
TABLET, FILM COATED ORAL
Qty: 1 PACKET | Refills: 0 | Status: SHIPPED | OUTPATIENT
Start: 2018-09-27 | End: 2018-11-07 | Stop reason: ALTCHOICE

## 2018-09-27 RX ORDER — METHYLPREDNISOLONE 4 MG/1
TABLET ORAL
Qty: 1 KIT | Refills: 0 | Status: SHIPPED | OUTPATIENT
Start: 2018-09-27 | End: 2018-10-03

## 2018-09-27 ASSESSMENT — ENCOUNTER SYMPTOMS: SINUS PRESSURE: 1

## 2018-10-02 ENCOUNTER — HOSPITAL ENCOUNTER (OUTPATIENT)
Age: 33
Discharge: HOME | End: 2018-10-02
Payer: COMMERCIAL

## 2018-10-02 LAB
FOLLICLE STIMULATING HORMONE: 4.4 MIU/ML (ref 0.6–16.9)
PROLACTIN: 45.4 NG/ML
T4 FREE: 2.28 NG/DL (ref 0.93–1.76)
TSH SERPL DL<=0.05 MIU/L-ACNC: 1.57 UIU/ML (ref 0.4–4.2)

## 2018-10-02 PROCEDURE — 84443 ASSAY THYROID STIM HORMONE: CPT

## 2018-10-02 PROCEDURE — 83520 IMMUNOASSAY QUANT NOS NONAB: CPT

## 2018-10-02 PROCEDURE — 86376 MICROSOMAL ANTIBODY EACH: CPT

## 2018-10-02 PROCEDURE — 84146 ASSAY OF PROLACTIN: CPT

## 2018-10-02 PROCEDURE — 86800 THYROGLOBULIN ANTIBODY: CPT

## 2018-10-02 PROCEDURE — 36415 COLL VENOUS BLD VENIPUNCTURE: CPT

## 2018-10-02 PROCEDURE — 84439 ASSAY OF FREE THYROXINE: CPT

## 2018-10-02 PROCEDURE — 83001 ASSAY OF GONADOTROPIN (FSH): CPT

## 2018-10-04 LAB
ANTI-MULLERIAN HORMONE: 5.39 NG/ML (ref 0.18–11.7)
THYROGLOBULIN AB: < 0.9 IU/ML (ref 0–4)
THYROID PEROXIDASE ANTIBODY: 0.3 IU/ML (ref 0–9)

## 2018-11-02 ENCOUNTER — PATIENT MESSAGE (OUTPATIENT)
Dept: FAMILY MEDICINE CLINIC | Age: 33
End: 2018-11-02

## 2018-11-02 RX ORDER — LABETALOL 100 MG/1
50 TABLET, FILM COATED ORAL 2 TIMES DAILY
Qty: 30 TABLET | Refills: 3 | Status: SHIPPED | OUTPATIENT
Start: 2018-11-02 | End: 2019-06-07 | Stop reason: SDUPTHER

## 2018-11-07 ENCOUNTER — OFFICE VISIT (OUTPATIENT)
Dept: FAMILY MEDICINE CLINIC | Age: 33
End: 2018-11-07
Payer: COMMERCIAL

## 2018-11-07 VITALS
RESPIRATION RATE: 14 BRPM | SYSTOLIC BLOOD PRESSURE: 110 MMHG | HEART RATE: 90 BPM | DIASTOLIC BLOOD PRESSURE: 70 MMHG | BODY MASS INDEX: 27.58 KG/M2 | OXYGEN SATURATION: 98 % | WEIGHT: 150.8 LBS | TEMPERATURE: 97.4 F

## 2018-11-07 DIAGNOSIS — R50.9 FEVER, UNSPECIFIED FEVER CAUSE: ICD-10-CM

## 2018-11-07 DIAGNOSIS — J01.00 ACUTE NON-RECURRENT MAXILLARY SINUSITIS: Primary | ICD-10-CM

## 2018-11-07 DIAGNOSIS — J02.9 SORE THROAT: ICD-10-CM

## 2018-11-07 LAB
INFLUENZA VIRUS A RNA: NEGATIVE
INFLUENZA VIRUS B RNA: NEGATIVE
STREPTOCOCCUS A RNA: NEGATIVE

## 2018-11-07 PROCEDURE — 1036F TOBACCO NON-USER: CPT | Performed by: FAMILY MEDICINE

## 2018-11-07 PROCEDURE — 87502 INFLUENZA DNA AMP PROBE: CPT | Performed by: FAMILY MEDICINE

## 2018-11-07 PROCEDURE — 99213 OFFICE O/P EST LOW 20 MIN: CPT | Performed by: FAMILY MEDICINE

## 2018-11-07 PROCEDURE — 87651 STREP A DNA AMP PROBE: CPT | Performed by: FAMILY MEDICINE

## 2018-11-07 PROCEDURE — G8484 FLU IMMUNIZE NO ADMIN: HCPCS | Performed by: FAMILY MEDICINE

## 2018-11-07 PROCEDURE — G8427 DOCREV CUR MEDS BY ELIG CLIN: HCPCS | Performed by: FAMILY MEDICINE

## 2018-11-07 PROCEDURE — G8419 CALC BMI OUT NRM PARAM NOF/U: HCPCS | Performed by: FAMILY MEDICINE

## 2018-11-07 RX ORDER — AZITHROMYCIN 250 MG/1
TABLET, FILM COATED ORAL
Qty: 1 PACKET | Refills: 0 | Status: SHIPPED | OUTPATIENT
Start: 2018-11-07 | End: 2019-04-04

## 2019-01-08 ENCOUNTER — PATIENT MESSAGE (OUTPATIENT)
Dept: FAMILY MEDICINE CLINIC | Age: 34
End: 2019-01-08

## 2019-01-08 RX ORDER — AMOXICILLIN AND CLAVULANATE POTASSIUM 500; 125 MG/1; MG/1
1 TABLET, FILM COATED ORAL 3 TIMES DAILY
Qty: 30 TABLET | Refills: 0 | Status: SHIPPED | OUTPATIENT
Start: 2019-01-08 | End: 2019-01-18

## 2019-01-09 ENCOUNTER — HOSPITAL ENCOUNTER (OUTPATIENT)
Age: 34
Discharge: HOME | End: 2019-01-09
Payer: COMMERCIAL

## 2019-01-09 LAB — MRSA SCREEN RT-PCR: NEGATIVE

## 2019-01-09 PROCEDURE — 87641 MR-STAPH DNA AMP PROBE: CPT

## 2019-03-08 ENCOUNTER — HOSPITAL ENCOUNTER (OUTPATIENT)
Age: 34
Discharge: HOME | End: 2019-03-08
Payer: COMMERCIAL

## 2019-03-08 LAB — PROGESTERONE LEVEL: 13.3 NG/ML

## 2019-03-08 PROCEDURE — 36415 COLL VENOUS BLD VENIPUNCTURE: CPT

## 2019-03-08 PROCEDURE — 84144 ASSAY OF PROGESTERONE: CPT

## 2019-03-22 ENCOUNTER — HOSPITAL ENCOUNTER (OUTPATIENT)
Age: 34
Discharge: HOME | End: 2019-03-22
Payer: COMMERCIAL

## 2019-03-22 LAB — HCG,BETA SUBUNIT,QUAL,SERUM: 2784 MIU/ML (ref 0–5)

## 2019-03-22 PROCEDURE — 36415 COLL VENOUS BLD VENIPUNCTURE: CPT

## 2019-03-22 PROCEDURE — 84702 CHORIONIC GONADOTROPIN TEST: CPT

## 2019-03-24 ENCOUNTER — HOSPITAL ENCOUNTER (OUTPATIENT)
Age: 34
Discharge: HOME | End: 2019-03-24
Payer: COMMERCIAL

## 2019-03-24 LAB — HCG,BETA SUBUNIT,QUAL,SERUM: 6863 MIU/ML (ref 0–5)

## 2019-03-24 PROCEDURE — 84702 CHORIONIC GONADOTROPIN TEST: CPT

## 2019-03-24 PROCEDURE — 36415 COLL VENOUS BLD VENIPUNCTURE: CPT

## 2019-04-04 ENCOUNTER — OFFICE VISIT (OUTPATIENT)
Dept: FAMILY MEDICINE CLINIC | Age: 34
End: 2019-04-04
Payer: COMMERCIAL

## 2019-04-04 VITALS
HEART RATE: 66 BPM | HEIGHT: 62 IN | OXYGEN SATURATION: 97 % | RESPIRATION RATE: 16 BRPM | SYSTOLIC BLOOD PRESSURE: 122 MMHG | BODY MASS INDEX: 28.49 KG/M2 | WEIGHT: 154.8 LBS | TEMPERATURE: 97.6 F | DIASTOLIC BLOOD PRESSURE: 62 MMHG

## 2019-04-04 DIAGNOSIS — H10.33 ACUTE BACTERIAL CONJUNCTIVITIS OF BOTH EYES: Primary | ICD-10-CM

## 2019-04-04 DIAGNOSIS — J01.00 ACUTE NON-RECURRENT MAXILLARY SINUSITIS: ICD-10-CM

## 2019-04-04 LAB
ABO: NORMAL
ANTIBODY SCREEN: NORMAL
ERYTHROCYTE [DISTWIDTH] IN BLOOD BY AUTOMATED COUNT: 13.4 % (ref 11.5–14.5)
ERYTHROCYTE [DISTWIDTH] IN BLOOD BY AUTOMATED COUNT: 40.9 FL (ref 35–45)
HCT VFR BLD CALC: 40 % (ref 37–47)
HEMOGLOBIN: 12.9 GM/DL (ref 12–16)
MCH RBC QN AUTO: 27.2 PG (ref 26–33)
MCHC RBC AUTO-ENTMCNC: 32.3 GM/DL (ref 32.2–35.5)
MCV RBC AUTO: 84.2 FL (ref 81–99)
PLATELET # BLD: 265 THOU/MM3 (ref 130–400)
PMV BLD AUTO: 11.7 FL (ref 9.4–12.4)
RBC # BLD: 4.75 MILL/MM3 (ref 4.2–5.4)
RH FACTOR: NORMAL
RUBELLA: 124.8 IU/ML
TSH SERPL DL<=0.05 MIU/L-ACNC: 1.14 UIU/ML (ref 0.4–4.2)
WBC # BLD: 8 THOU/MM3 (ref 4.8–10.8)

## 2019-04-04 PROCEDURE — 99213 OFFICE O/P EST LOW 20 MIN: CPT | Performed by: NURSE PRACTITIONER

## 2019-04-04 PROCEDURE — G8427 DOCREV CUR MEDS BY ELIG CLIN: HCPCS | Performed by: NURSE PRACTITIONER

## 2019-04-04 PROCEDURE — G8419 CALC BMI OUT NRM PARAM NOF/U: HCPCS | Performed by: NURSE PRACTITIONER

## 2019-04-04 PROCEDURE — 1036F TOBACCO NON-USER: CPT | Performed by: NURSE PRACTITIONER

## 2019-04-04 RX ORDER — GENTAMICIN SULFATE 3 MG/ML
2 SOLUTION/ DROPS OPHTHALMIC 3 TIMES DAILY
Qty: 1 BOTTLE | Refills: 0 | Status: SHIPPED | OUTPATIENT
Start: 2019-04-04 | End: 2019-04-14

## 2019-04-04 RX ORDER — AMOXICILLIN 500 MG/1
1000 CAPSULE ORAL 2 TIMES DAILY
Qty: 40 CAPSULE | Refills: 0 | Status: SHIPPED | OUTPATIENT
Start: 2019-04-04 | End: 2019-04-14

## 2019-04-04 ASSESSMENT — PATIENT HEALTH QUESTIONNAIRE - PHQ9
2. FEELING DOWN, DEPRESSED OR HOPELESS: 0
SUM OF ALL RESPONSES TO PHQ QUESTIONS 1-9: 0
SUM OF ALL RESPONSES TO PHQ QUESTIONS 1-9: 0
1. LITTLE INTEREST OR PLEASURE IN DOING THINGS: 0
SUM OF ALL RESPONSES TO PHQ9 QUESTIONS 1 & 2: 0

## 2019-04-04 ASSESSMENT — ENCOUNTER SYMPTOMS
EYE DISCHARGE: 1
SINUS PRESSURE: 1
GASTROINTESTINAL NEGATIVE: 1
RESPIRATORY NEGATIVE: 1

## 2019-04-04 NOTE — PROGRESS NOTES
Ny Al is a 35 y.o. female whopresents today for :  Chief Complaint   Patient presents with    Conjunctivitis       HPI:     HPI  Has been having sinus pain and pressure. Then past 2 days eyes are now matted shut. Patient Active Problem List   Diagnosis    Pelvic pain in female    Endometriosis    Infertility, female   [place] Macrosomia affecting management of mother    Endometriosis        Past Medical History:   Diagnosis Date    Chronic back pain     History of migraine headaches 1997    Prolonged emergence from general anesthesia     Sinusitis       Past Surgical History:   Procedure Laterality Date    LAPAROSCOPY  11/10/2016    diagnostic lap with laser and dye    PELVIC LAPAROSCOPY  10/22/13    diagnostic, laser endometriosis, hysteroscopy, tubal dye instillation    SINUS SURGERY       Family History   Problem Relation Age of Onset    High Blood Pressure Mother     Arthritis Father     Heart Disease Father 61        MI    High Cholesterol Father     High Blood Pressure Father     Heart Attack Father     Diabetes Maternal Grandfather     Cancer Neg Hx     Stroke Neg Hx      Social History     Tobacco Use    Smoking status: Never Smoker    Smokeless tobacco: Never Used   Substance Use Topics    Alcohol use:  Yes     Alcohol/week: 0.0 oz     Comment: occasional      Current Outpatient Medications   Medication Sig Dispense Refill    gentamicin (GARAMYCIN) 0.3 % ophthalmic solution Place 2 drops into both eyes 3 times daily for 10 days 1 Bottle 0    amoxicillin (AMOXIL) 500 MG capsule Take 2 capsules by mouth 2 times daily for 10 days 40 capsule 0    Prenatal Vit-Fe Fumarate-FA (PRENATAL 1 PLUS 1) 65-1 MG TABS Take by mouth daily       labetalol (NORMODYNE) 100 MG tablet Take 0.5 tablets by mouth 2 times daily 30 tablet 3    acetaminophen (TYLENOL) 500 MG tablet Take 500 mg by mouth every 4 hours as needed for Pain      fluticasone (FLONASE) 50 MCG/ACT nasal spray 1 spray by Nasal route daily 1 Bottle 3     No current facility-administered medications for this visit. Allergies   Allergen Reactions    Latex Rash    Biaxin [Clarithromycin]      nausea    Percocet [Oxycodone-Acetaminophen] Other (See Comments)     \"delusional\"     Health Maintenance   Topic Date Due    Varicella Vaccine (1 of 2 - 13+ 2-dose series) 05/04/1998    DTaP/Tdap/Td vaccine (1 - Tdap) 11/20/2015    Cervical cancer screen  04/13/2019    Flu vaccine  Completed    HIV screen  Completed       Subjective:     Review of Systems   Constitutional: Negative. HENT: Positive for congestion and sinus pressure. Eyes: Positive for discharge. Respiratory: Negative. Cardiovascular: Negative. Gastrointestinal: Negative. Musculoskeletal: Negative. Skin: Negative. Neurological: Negative. Objective:     Vitals:    04/04/19 1604   BP: 122/62   Site: Left Upper Arm   Position: Sitting   Cuff Size: Medium Adult   Pulse: 66   Resp: 16   Temp: 97.6 °F (36.4 °C)   TempSrc: Temporal   SpO2: 97%   Weight: 154 lb 12.8 oz (70.2 kg)   Height: 5' 2.01\" (1.575 m)       Physical Exam   Constitutional: She is oriented to person, place, and time. She appears well-developed and well-nourished. HENT:   Head: Normocephalic. Right Ear: Tympanic membrane and external ear normal.   Left Ear: Tympanic membrane and external ear normal.   Nose: Mucosal edema and rhinorrhea present. Right sinus exhibits maxillary sinus tenderness. Left sinus exhibits maxillary sinus tenderness. Mouth/Throat: Oropharynx is clear and moist.   Eyes: Right eye exhibits discharge. Left eye exhibits discharge. Neck: Normal range of motion. Neck supple. Cardiovascular: Normal rate, regular rhythm, normal heart sounds and intact distal pulses. Exam reveals no gallop and no friction rub. No murmur heard. Pulmonary/Chest: Effort normal and breath sounds normal. She has no wheezes. She has no rales. Abdominal: Soft.  Bowel sounds are normal. There is no tenderness. There is no guarding. Musculoskeletal: Normal range of motion. Lymphadenopathy:     She has no cervical adenopathy. Neurological: She is alert and oriented to person, place, and time. She has normal reflexes. Skin: Skin is warm. Psychiatric: She has a normal mood and affect. Assessment:      Diagnosis Orders   1. Acute bacterial conjunctivitis of both eyes  gentamicin (GARAMYCIN) 0.3 % ophthalmic solution   2. Acute non-recurrent maxillary sinusitis  amoxicillin (AMOXIL) 500 MG capsule       Plan:      No follow-ups on file. Orders Placed This Encounter   Procedures    Urine Culture    RPR Reflex to Titer and TPPA    RUBELLA SCREEN    TSH with Reflex    CBC    HIV Screen    RPR Reflex to Titer and TPPA    RUBELLA SCREEN    TSH with Reflex    ABO/Rh    Antibody Screen     Orders Placed This Encounter   Medications    gentamicin (GARAMYCIN) 0.3 % ophthalmic solution     Sig: Place 2 drops into both eyes 3 times daily for 10 days     Dispense:  1 Bottle     Refill:  0    amoxicillin (AMOXIL) 500 MG capsule     Sig: Take 2 capsules by mouth 2 times daily for 10 days     Dispense:  40 capsule     Refill:  0    see orders  Pt is 7 weeks pregnant. Will hold off amoxil for now       Patient given educational materials - seepatient instructions. Discussed use, benefit, and side effects of prescribed medications. All patient questions answered. Pt voiced understanding. Patient agreed withtreatment plan. Follow up as directed.      Electronically signed by REINA Wallace CNP on 4/4/2019 at 5:31 PM

## 2019-04-05 LAB — RPR: NONREACTIVE

## 2019-04-06 LAB
ORGANISM: ABNORMAL
URINE CULTURE, ROUTINE: ABNORMAL

## 2019-04-07 LAB — HIV-2 AB: NEGATIVE

## 2019-04-17 ENCOUNTER — HOSPITAL ENCOUNTER (OUTPATIENT)
Age: 34
Discharge: HOME | End: 2019-04-17
Payer: COMMERCIAL

## 2019-04-17 ENCOUNTER — ANESTHESIA EVENT (OUTPATIENT)
Dept: OPERATING ROOM | Age: 34
End: 2019-04-17
Payer: COMMERCIAL

## 2019-04-17 LAB
ABO: NORMAL
BASOPHILS # BLD: 0.3 %
BASOPHILS ABSOLUTE: 0 THOU/MM3 (ref 0–0.1)
EOSINOPHIL # BLD: 0.3 %
EOSINOPHILS ABSOLUTE: 0 THOU/MM3 (ref 0–0.4)
ERYTHROCYTE [DISTWIDTH] IN BLOOD BY AUTOMATED COUNT: 13.4 % (ref 11.5–14.5)
ERYTHROCYTE [DISTWIDTH] IN BLOOD BY AUTOMATED COUNT: 41.2 FL (ref 35–45)
HCT VFR BLD CALC: 38.7 % (ref 37–47)
HEMOGLOBIN: 12.5 GM/DL (ref 12–16)
IMMATURE GRANS (ABS): 0.02 THOU/MM3 (ref 0–0.07)
IMMATURE GRANULOCYTES: 0.3 %
LYMPHOCYTES # BLD: 25.9 %
LYMPHOCYTES ABSOLUTE: 1.9 THOU/MM3 (ref 1–4.8)
MCH RBC QN AUTO: 27.4 PG (ref 26–33)
MCHC RBC AUTO-ENTMCNC: 32.3 GM/DL (ref 32.2–35.5)
MCV RBC AUTO: 84.7 FL (ref 81–99)
MONOCYTES # BLD: 6.1 %
MONOCYTES ABSOLUTE: 0.4 THOU/MM3 (ref 0.4–1.3)
NUCLEATED RED BLOOD CELLS: 0 /100 WBC
PLATELET # BLD: 270 THOU/MM3 (ref 130–400)
PMV BLD AUTO: 11.1 FL (ref 9.4–12.4)
RBC # BLD: 4.57 MILL/MM3 (ref 4.2–5.4)
RH FACTOR: NORMAL
SEG NEUTROPHILS: 67.1 %
SEGMENTED NEUTROPHILS ABSOLUTE COUNT: 4.9 THOU/MM3 (ref 1.8–7.7)
WBC # BLD: 7.3 THOU/MM3 (ref 4.8–10.8)

## 2019-04-17 PROCEDURE — 36415 COLL VENOUS BLD VENIPUNCTURE: CPT

## 2019-04-17 PROCEDURE — 86900 BLOOD TYPING SEROLOGIC ABO: CPT

## 2019-04-17 PROCEDURE — 85025 COMPLETE CBC W/AUTO DIFF WBC: CPT

## 2019-04-17 PROCEDURE — 86901 BLOOD TYPING SEROLOGIC RH(D): CPT

## 2019-04-18 ENCOUNTER — ANESTHESIA (OUTPATIENT)
Dept: OPERATING ROOM | Age: 34
End: 2019-04-18
Payer: COMMERCIAL

## 2019-04-18 ENCOUNTER — HOSPITAL ENCOUNTER (OUTPATIENT)
Age: 34
Setting detail: OUTPATIENT SURGERY
Discharge: HOME | End: 2019-04-18
Attending: OBSTETRICS & GYNECOLOGY | Admitting: OBSTETRICS & GYNECOLOGY
Payer: COMMERCIAL

## 2019-04-18 VITALS
HEIGHT: 61 IN | DIASTOLIC BLOOD PRESSURE: 64 MMHG | SYSTOLIC BLOOD PRESSURE: 120 MMHG | TEMPERATURE: 98.5 F | HEART RATE: 87 BPM | WEIGHT: 153 LBS | RESPIRATION RATE: 16 BRPM | BODY MASS INDEX: 28.89 KG/M2 | OXYGEN SATURATION: 98 %

## 2019-04-18 VITALS
DIASTOLIC BLOOD PRESSURE: 57 MMHG | SYSTOLIC BLOOD PRESSURE: 104 MMHG | OXYGEN SATURATION: 100 % | RESPIRATION RATE: 2 BRPM | TEMPERATURE: 98.6 F

## 2019-04-18 DIAGNOSIS — O02.1 MISSED ABORTION: Primary | ICD-10-CM

## 2019-04-18 PROCEDURE — 2500000003 HC RX 250 WO HCPCS: Performed by: REGISTERED NURSE

## 2019-04-18 PROCEDURE — 88262 CHROMOSOME ANALYSIS 15-20: CPT

## 2019-04-18 PROCEDURE — 3600000012 HC SURGERY LEVEL 2 ADDTL 15MIN: Performed by: OBSTETRICS & GYNECOLOGY

## 2019-04-18 PROCEDURE — 88305 TISSUE EXAM BY PATHOLOGIST: CPT

## 2019-04-18 PROCEDURE — 3700000000 HC ANESTHESIA ATTENDED CARE: Performed by: OBSTETRICS & GYNECOLOGY

## 2019-04-18 PROCEDURE — 7100000001 HC PACU RECOVERY - ADDTL 15 MIN: Performed by: OBSTETRICS & GYNECOLOGY

## 2019-04-18 PROCEDURE — 7100000010 HC PHASE II RECOVERY - FIRST 15 MIN: Performed by: OBSTETRICS & GYNECOLOGY

## 2019-04-18 PROCEDURE — 2500000003 HC RX 250 WO HCPCS: Performed by: OBSTETRICS & GYNECOLOGY

## 2019-04-18 PROCEDURE — 2709999900 HC NON-CHARGEABLE SUPPLY: Performed by: OBSTETRICS & GYNECOLOGY

## 2019-04-18 PROCEDURE — 3600000002 HC SURGERY LEVEL 2 BASE: Performed by: OBSTETRICS & GYNECOLOGY

## 2019-04-18 PROCEDURE — 6360000002 HC RX W HCPCS: Performed by: OBSTETRICS & GYNECOLOGY

## 2019-04-18 PROCEDURE — 6360000002 HC RX W HCPCS: Performed by: REGISTERED NURSE

## 2019-04-18 PROCEDURE — 6370000000 HC RX 637 (ALT 250 FOR IP): Performed by: OBSTETRICS & GYNECOLOGY

## 2019-04-18 PROCEDURE — 7100000011 HC PHASE II RECOVERY - ADDTL 15 MIN: Performed by: OBSTETRICS & GYNECOLOGY

## 2019-04-18 PROCEDURE — 3700000001 HC ADD 15 MINUTES (ANESTHESIA): Performed by: OBSTETRICS & GYNECOLOGY

## 2019-04-18 PROCEDURE — 7100000000 HC PACU RECOVERY - FIRST 15 MIN: Performed by: OBSTETRICS & GYNECOLOGY

## 2019-04-18 PROCEDURE — 88233 TISSUE CULTURE SKIN/BIOPSY: CPT

## 2019-04-18 PROCEDURE — 6360000002 HC RX W HCPCS

## 2019-04-18 PROCEDURE — 2580000003 HC RX 258: Performed by: OBSTETRICS & GYNECOLOGY

## 2019-04-18 RX ORDER — MORPHINE SULFATE 2 MG/ML
4 INJECTION, SOLUTION INTRAMUSCULAR; INTRAVENOUS
Status: DISCONTINUED | OUTPATIENT
Start: 2019-04-18 | End: 2019-04-18 | Stop reason: HOSPADM

## 2019-04-18 RX ORDER — SODIUM CHLORIDE, SODIUM LACTATE, POTASSIUM CHLORIDE, CALCIUM CHLORIDE 600; 310; 30; 20 MG/100ML; MG/100ML; MG/100ML; MG/100ML
INJECTION, SOLUTION INTRAVENOUS CONTINUOUS
Status: DISCONTINUED | OUTPATIENT
Start: 2019-04-18 | End: 2019-04-18 | Stop reason: HOSPADM

## 2019-04-18 RX ORDER — HYDROCODONE BITARTRATE AND ACETAMINOPHEN 5; 325 MG/1; MG/1
2 TABLET ORAL EVERY 4 HOURS PRN
Status: DISCONTINUED | OUTPATIENT
Start: 2019-04-18 | End: 2019-04-18 | Stop reason: HOSPADM

## 2019-04-18 RX ORDER — IBUPROFEN 600 MG/1
600 TABLET ORAL EVERY 6 HOURS PRN
Status: DISCONTINUED | OUTPATIENT
Start: 2019-04-18 | End: 2019-04-18 | Stop reason: HOSPADM

## 2019-04-18 RX ORDER — LIDOCAINE HYDROCHLORIDE 20 MG/ML
INJECTION, SOLUTION INTRAVENOUS PRN
Status: DISCONTINUED | OUTPATIENT
Start: 2019-04-18 | End: 2019-04-18 | Stop reason: SDUPTHER

## 2019-04-18 RX ORDER — MIDAZOLAM HYDROCHLORIDE 1 MG/ML
2 INJECTION INTRAMUSCULAR; INTRAVENOUS ONCE
Status: COMPLETED | OUTPATIENT
Start: 2019-04-18 | End: 2019-04-18

## 2019-04-18 RX ORDER — FENTANYL CITRATE 50 UG/ML
INJECTION, SOLUTION INTRAMUSCULAR; INTRAVENOUS PRN
Status: DISCONTINUED | OUTPATIENT
Start: 2019-04-18 | End: 2019-04-18 | Stop reason: SDUPTHER

## 2019-04-18 RX ORDER — MIDAZOLAM HYDROCHLORIDE 1 MG/ML
INJECTION INTRAMUSCULAR; INTRAVENOUS PRN
Status: DISCONTINUED | OUTPATIENT
Start: 2019-04-18 | End: 2019-04-18 | Stop reason: SDUPTHER

## 2019-04-18 RX ORDER — SODIUM CHLORIDE, SODIUM LACTATE, POTASSIUM CHLORIDE, CALCIUM CHLORIDE 600; 310; 30; 20 MG/100ML; MG/100ML; MG/100ML; MG/100ML
INJECTION, SOLUTION INTRAVENOUS SEE ADMIN INSTRUCTIONS
Status: DISCONTINUED | OUTPATIENT
Start: 2019-04-18 | End: 2019-04-18 | Stop reason: HOSPADM

## 2019-04-18 RX ORDER — MEPERIDINE HYDROCHLORIDE 25 MG/ML
INJECTION INTRAMUSCULAR; INTRAVENOUS; SUBCUTANEOUS
Status: COMPLETED
Start: 2019-04-18 | End: 2019-04-18

## 2019-04-18 RX ORDER — ONDANSETRON 2 MG/ML
8 INJECTION INTRAMUSCULAR; INTRAVENOUS EVERY 8 HOURS PRN
Status: DISCONTINUED | OUTPATIENT
Start: 2019-04-18 | End: 2019-04-18 | Stop reason: HOSPADM

## 2019-04-18 RX ORDER — KETOROLAC TROMETHAMINE 30 MG/ML
30 INJECTION, SOLUTION INTRAMUSCULAR; INTRAVENOUS ONCE
Status: COMPLETED | OUTPATIENT
Start: 2019-04-18 | End: 2019-04-18

## 2019-04-18 RX ORDER — MIDAZOLAM HYDROCHLORIDE 1 MG/ML
2 INJECTION INTRAMUSCULAR; INTRAVENOUS ONCE
Status: DISCONTINUED | OUTPATIENT
Start: 2019-04-18 | End: 2019-04-18 | Stop reason: HOSPADM

## 2019-04-18 RX ORDER — DEXAMETHASONE SODIUM PHOSPHATE 4 MG/ML
INJECTION, SOLUTION INTRA-ARTICULAR; INTRALESIONAL; INTRAMUSCULAR; INTRAVENOUS; SOFT TISSUE PRN
Status: DISCONTINUED | OUTPATIENT
Start: 2019-04-18 | End: 2019-04-18 | Stop reason: SDUPTHER

## 2019-04-18 RX ORDER — METOCLOPRAMIDE HYDROCHLORIDE 5 MG/ML
INJECTION INTRAMUSCULAR; INTRAVENOUS PRN
Status: DISCONTINUED | OUTPATIENT
Start: 2019-04-18 | End: 2019-04-18 | Stop reason: SDUPTHER

## 2019-04-18 RX ORDER — MORPHINE SULFATE 2 MG/ML
2 INJECTION, SOLUTION INTRAMUSCULAR; INTRAVENOUS
Status: DISCONTINUED | OUTPATIENT
Start: 2019-04-18 | End: 2019-04-18 | Stop reason: HOSPADM

## 2019-04-18 RX ORDER — DOCUSATE SODIUM 100 MG/1
100 CAPSULE, LIQUID FILLED ORAL 2 TIMES DAILY
Status: DISCONTINUED | OUTPATIENT
Start: 2019-04-18 | End: 2019-04-18 | Stop reason: HOSPADM

## 2019-04-18 RX ORDER — MEPERIDINE HYDROCHLORIDE 25 MG/ML
12.5 INJECTION INTRAMUSCULAR; INTRAVENOUS; SUBCUTANEOUS ONCE
Status: COMPLETED | OUTPATIENT
Start: 2019-04-18 | End: 2019-04-18

## 2019-04-18 RX ORDER — GLYCOPYRROLATE 1 MG/5 ML
SYRINGE (ML) INTRAVENOUS PRN
Status: DISCONTINUED | OUTPATIENT
Start: 2019-04-18 | End: 2019-04-18 | Stop reason: SDUPTHER

## 2019-04-18 RX ORDER — HYDROCODONE BITARTRATE AND ACETAMINOPHEN 5; 325 MG/1; MG/1
1 TABLET ORAL EVERY 4 HOURS PRN
Status: DISCONTINUED | OUTPATIENT
Start: 2019-04-18 | End: 2019-04-18 | Stop reason: HOSPADM

## 2019-04-18 RX ORDER — PROPOFOL 10 MG/ML
INJECTION, EMULSION INTRAVENOUS PRN
Status: DISCONTINUED | OUTPATIENT
Start: 2019-04-18 | End: 2019-04-18 | Stop reason: SDUPTHER

## 2019-04-18 RX ORDER — ONDANSETRON 2 MG/ML
INJECTION INTRAMUSCULAR; INTRAVENOUS PRN
Status: DISCONTINUED | OUTPATIENT
Start: 2019-04-18 | End: 2019-04-18 | Stop reason: SDUPTHER

## 2019-04-18 RX ORDER — HYDROCODONE BITARTRATE AND ACETAMINOPHEN 5; 325 MG/1; MG/1
1 TABLET ORAL EVERY 6 HOURS PRN
Qty: 12 TABLET | Refills: 0 | Status: SHIPPED | OUTPATIENT
Start: 2019-04-18 | End: 2019-04-21

## 2019-04-18 RX ORDER — ACETAMINOPHEN 325 MG/1
650 TABLET ORAL EVERY 4 HOURS PRN
Status: DISCONTINUED | OUTPATIENT
Start: 2019-04-18 | End: 2019-04-18 | Stop reason: HOSPADM

## 2019-04-18 RX ORDER — DOXYCYCLINE HYCLATE 100 MG
200 TABLET ORAL ONCE
Status: COMPLETED | OUTPATIENT
Start: 2019-04-18 | End: 2019-04-18

## 2019-04-18 RX ADMIN — ONDANSETRON HYDROCHLORIDE 4 MG: 4 INJECTION, SOLUTION INTRAMUSCULAR; INTRAVENOUS at 08:20

## 2019-04-18 RX ADMIN — MEPERIDINE HYDROCHLORIDE 12.5 MG: 25 INJECTION INTRAMUSCULAR; INTRAVENOUS; SUBCUTANEOUS at 08:54

## 2019-04-18 RX ADMIN — MIDAZOLAM HYDROCHLORIDE 2 MG: 1 INJECTION, SOLUTION INTRAMUSCULAR; INTRAVENOUS at 08:44

## 2019-04-18 RX ADMIN — PROPOFOL 200 MG: 10 INJECTION, EMULSION INTRAVENOUS at 08:16

## 2019-04-18 RX ADMIN — Medication 0.2 MG: at 08:16

## 2019-04-18 RX ADMIN — LIDOCAINE HYDROCHLORIDE 100 MG: 20 INJECTION, SOLUTION INTRAVENOUS at 08:16

## 2019-04-18 RX ADMIN — SODIUM CHLORIDE, POTASSIUM CHLORIDE, SODIUM LACTATE AND CALCIUM CHLORIDE: 600; 310; 30; 20 INJECTION, SOLUTION INTRAVENOUS at 08:12

## 2019-04-18 RX ADMIN — FAMOTIDINE 20 MG: 10 INJECTION, SOLUTION INTRAVENOUS at 08:20

## 2019-04-18 RX ADMIN — METOCLOPRAMIDE 10 MG: 5 INJECTION, SOLUTION INTRAMUSCULAR; INTRAVENOUS at 08:20

## 2019-04-18 RX ADMIN — FENTANYL CITRATE 100 MCG: 50 INJECTION INTRAMUSCULAR; INTRAVENOUS at 08:09

## 2019-04-18 RX ADMIN — HYDROCODONE BITARTRATE AND ACETAMINOPHEN 1 TABLET: 5; 325 TABLET ORAL at 10:03

## 2019-04-18 RX ADMIN — DEXAMETHASONE SODIUM PHOSPHATE 8 MG: 4 INJECTION, SOLUTION INTRAMUSCULAR; INTRAVENOUS at 08:20

## 2019-04-18 RX ADMIN — DOXYCYCLINE 100 MG: 100 INJECTION, POWDER, LYOPHILIZED, FOR SOLUTION INTRAVENOUS at 08:21

## 2019-04-18 RX ADMIN — KETOROLAC TROMETHAMINE 30 MG: 30 INJECTION, SOLUTION INTRAMUSCULAR at 08:00

## 2019-04-18 RX ADMIN — MIDAZOLAM HYDROCHLORIDE 2 MG: 1 INJECTION INTRAMUSCULAR; INTRAVENOUS at 08:45

## 2019-04-18 RX ADMIN — DOXYCYCLINE HYCLATE 200 MG: 100 TABLET, COATED ORAL at 11:43

## 2019-04-18 ASSESSMENT — PULMONARY FUNCTION TESTS
PIF_VALUE: 14
PIF_VALUE: 14
PIF_VALUE: 13
PIF_VALUE: 13
PIF_VALUE: 4
PIF_VALUE: 14
PIF_VALUE: 13
PIF_VALUE: 0
PIF_VALUE: 17
PIF_VALUE: 0
PIF_VALUE: 16
PIF_VALUE: 13
PIF_VALUE: 1
PIF_VALUE: 21
PIF_VALUE: 2
PIF_VALUE: 17
PIF_VALUE: 14
PIF_VALUE: 4
PIF_VALUE: 14
PIF_VALUE: 13
PIF_VALUE: 17
PIF_VALUE: 14
PIF_VALUE: 14
PIF_VALUE: 4
PIF_VALUE: 3
PIF_VALUE: 17
PIF_VALUE: 16
PIF_VALUE: 13
PIF_VALUE: 1

## 2019-04-18 ASSESSMENT — PAIN SCALES - GENERAL
PAINLEVEL_OUTOF10: 0
PAINLEVEL_OUTOF10: 4
PAINLEVEL_OUTOF10: 0
PAINLEVEL_OUTOF10: 4
PAINLEVEL_OUTOF10: 0

## 2019-04-18 NOTE — H&P
[facility]  History and Physical Update    Pt Name: Amy Macias  MRN: [id number]  YOB: 1985  Date of evaluation: 2019    [x] I have examined the patient and reviewed the H&P/Consult and there are no changes to the patient or plans. 32yo  who was found to have missed AB in the office yesterday. Plan for Suction D&C.     [] I have examined the patient and reviewed the H&P/Consult and have noted the following changes:            Argelia Orozco MD  Electronically signed 2019 at 8:34 AM

## 2019-04-18 NOTE — OP NOTE
Gyn Service    Operative Report      Pt Name: Kimberly Guerra  MRN: [id number] [place] #: [de-identified]  YOB: 1985  Procedure Performed By: Estephania Masters MD        Pre-operative Diagnosis:  MISSED     Post-operative Diagnosis:  SAME    Procedure:  SUCTION D AND C    Surgeon:  HARPER Ingram MD    Anesthesia:  GENERAL     Estimated blood loss:   100 ml    Findings:  POC'S    Complications:  NONE      Patient was taken to the operative room and placed under general anesthesia, in the dorsal lithotomy position and prepped and draped in the normal sterile fashion. The cervix was grasped and sounded to  10 cm. The cervix was dilated and the #8 curved curette was placed gently to the fundus. A suction curettage took place and a moderate amount of tissue. A gentlesharp curettage took place to ensure no retain tissue in the cornua. A last pass with the suction curette took place to assure all clots and debris are removed. Hemostasis was assured. The patient was awakened from anesthesia and taken to the recovery room in good condition.   @[facility]@

## 2019-04-18 NOTE — PROGRESS NOTES
DISCHARGE INSTRUCTIONS REVIEWED WITH PATIENT AND FAMILY. SCANT AMOUNT OF DRAINAGE ON CASSIUS PAD. DISCHARGED BY WHEELCHAIR TO CAR.

## 2019-04-18 NOTE — ANESTHESIA POSTPROCEDURE EVALUATION
Department of Anesthesiology  Postprocedure Note    Patient: Bereket Atkins  MRN: [id number]  YOB: 1985  Date of evaluation: 2019  Time:  11:01 AM     Procedure Summary     Date:  19 Room / Location:  [place] [facility] / [place] [facility]    Anesthesia Start:  0153 Anesthesia Stop:  Flor Levels    Procedure:  SUCTION D & C (N/A Uterus) Diagnosis:  (MISSED )    Surgeon:  Abigail iRdley MD Responsible Provider:  Natalee Olivo MD    Anesthesia Type:  general ASA Status:  1          Anesthesia Type: general    Daniel Phase I: Daniel Score: 10    Daniel Phase II:      Last vitals: Reviewed and per EMR flowsheets. Anesthesia Post Evaluation    Patient location during evaluation: PACU  Patient participation: complete - patient participated  Level of consciousness: awake and alert  Airway patency: patent  Nausea & Vomiting: no nausea and no vomiting  Complications: no  Cardiovascular status: hemodynamically stable  Respiratory status: acceptable  Hydration status: euvolemic      [facility]  POST-ANESTHESIA NOTE       Name:  Bereket Atkins                                         Age:  35 y.o.   MRN:  [id number]      Last Vitals:  /60   Pulse 82   Temp 98.1 °F (36.7 °C) (Temporal)   Resp 16   Ht 5' 1\" (1.549 m)   Wt 153 lb (69.4 kg)   SpO2 100%   BMI 28.91 kg/m²   Patient Vitals for the past 4 hrs:   BP Temp Temp src Pulse Resp SpO2 Height Weight   19 1000 122/60 -- -- 82 16 100 % -- --   19 0915 (!) 104/55 -- -- 91 16 100 % -- --   19 0910 (!) 107/58 -- -- 79 15 100 % -- --   19 0905 (!) 106/55 -- -- 80 15 100 % -- --   19 0900 (!) 110/58 -- -- 83 15 100 % -- --   19 0855 (!) 104/55 -- -- 80 18 100 % -- --   19 0850 115/61 -- -- 84 14 100 % -- --   19 0845 117/63 -- -- 103 12 97 % -- --   04/18/19 0844 -- 98.1 °F (36.7 °C) Temporal 102 16 98 % -- --   19 0733 -- -- -- -- -- -- 5' 1\" (1.549 m) 153 lb (69.4 kg)   19 0720 (!) 123/58 99.7 °F (37.6 °C) Temporal 92 16 100 % -- 153 lb (69.4 kg)       Level of Consciousness:  Awake    Respiratory:  Stable    Oxygen Saturation:  Stable    Cardiovascular:  Stable    Hydration:  Adequate    PONV:  Stable    Post-op Pain:  Adequate analgesia    Post-op Assessment:  No apparent anesthetic complications    Additional Follow-Up / Treatment / Comment:  Kunal Rock MD  April 18, 2019   11:01 AM

## 2019-04-18 NOTE — ANESTHESIA PRE PROCEDURE
Department of Anesthesiology  Preprocedure Note       Name:  Rosalina Crigler   Age:  35 y.o.  :  1985                                          MRN:  [id number]         Date:  2019      Surgeon: Solis Nava):  Harrison Clarke MD    Procedure: SUCTION D & C (N/A Uterus)    Medications prior to admission:   Prior to Admission medications    Medication Sig Start Date End Date Taking? Authorizing Provider   Promethazine HCl (PHENERGAN PO) Take 25 mg by mouth   Yes Historical Provider, MD   acetaminophen (TYLENOL) 500 MG tablet Take 500 mg by mouth every 4 hours as needed for Pain   Yes Historical Provider, MD   fluticasone (FLONASE) 50 MCG/ACT nasal spray 1 spray by Nasal route daily 3/7/16  Yes Edvin Goddard MD   Prenatal Vit-Fe Fumarate-FA (PRENATAL 1 PLUS 1) 65-1 MG TABS Take by mouth daily    Yes Historical Provider, MD   labetalol (NORMODYNE) 100 MG tablet Take 0.5 tablets by mouth 2 times daily 18   Edvin Goddard MD       Current medications:    Current Facility-Administered Medications   Medication Dose Route Frequency Provider Last Rate Last Dose    lactated ringers infusion   Intravenous Continuous Harrison Clarke MD        ketorolac (TORADOL) injection 30 mg  30 mg Intravenous Once Harrison Clarke MD        ondansetron [facility]) injection 8 mg  8 mg Intravenous Q8H PRN Harrison Clarke MD        doxycycline (VIBRAMYCIN) 100 mg in dextrose 5 % 100 mL IVPB  100 mg Intravenous On Call to 0383 Jean Paul Rodriguez MD        midazolam (VERSED) injection 2 mg  2 mg Intravenous Once Alfredo Panda MD           Allergies:     Allergies   Allergen Reactions    Latex Rash    Biaxin [Clarithromycin]      nausea    Percocet [Oxycodone-Acetaminophen] Other (See Comments)     \"delusional\"       Problem List:    Patient Active Problem List   Diagnosis Code    Pelvic pain in female R10.2    Endometriosis N80.9    Infertility, female N80.10    Macrosomia affecting management of mother O43.56X0    Endometriosis N80.9 02/01/2018    PROT 7.1 02/01/2018    CALCIUM 9.0 02/01/2018    BILITOT 0.3 02/01/2018    ALKPHOS 38 02/01/2018    AST 14 02/01/2018    ALT 17 02/01/2018       POC Tests: No results for input(s): POCGLU, POCNA, POCK, POCCL, POCBUN, POCHEMO, POCHCT in the last 72 hours. Coags: No results found for: PROTIME, INR, APTT    HCG (If Applicable):   Lab Results   Component Value Date    PREGTESTUR negative 11/10/2016        ABGs: No results found for: PHART, PO2ART, JGM7WHC, KTR5CEF, BEART, T0IFIWCE     Type & Screen (If Applicable):  Lab Results   Component Value Date    [facility] 04/17/2019       Anesthesia Evaluation  Patient summary reviewed  Airway: Mallampati: II  TM distance: >3 FB   Neck ROM: full  Mouth opening: > = 3 FB Dental: normal exam         Pulmonary:normal exam  breath sounds clear to auscultation                             Cardiovascular:  Exercise tolerance: good (>4 METS),           Rhythm: regular  Rate: normal                    Neuro/Psych:   (+) headaches: migraine headaches,             GI/Hepatic/Renal: Neg GI/Hepatic/Renal ROS            Endo/Other: Negative Endo/Other ROS                    Abdominal:           Vascular:                                        Anesthesia Plan      general     ASA 1       Induction: intravenous. MIPS: Postoperative opioids intended and Prophylactic antiemetics administered. Anesthetic plan and risks discussed with patient. Plan discussed with surgical team and CRNA.                   Gabriela Celestin MD   4/18/2019

## 2019-05-03 LAB — CHROMOSOME ANALYSIS PRODUCTS OF CONCEPTION: NORMAL

## 2019-05-15 ENCOUNTER — OFFICE VISIT (OUTPATIENT)
Dept: FAMILY MEDICINE CLINIC | Age: 34
End: 2019-05-15
Payer: COMMERCIAL

## 2019-05-15 VITALS
BODY MASS INDEX: 29.29 KG/M2 | HEART RATE: 88 BPM | WEIGHT: 155 LBS | SYSTOLIC BLOOD PRESSURE: 122 MMHG | DIASTOLIC BLOOD PRESSURE: 80 MMHG | RESPIRATION RATE: 12 BRPM

## 2019-05-15 DIAGNOSIS — F43.0 STRESS REACTION: ICD-10-CM

## 2019-05-15 DIAGNOSIS — N96 HISTORY OF MULTIPLE MISCARRIAGES: ICD-10-CM

## 2019-05-15 DIAGNOSIS — K58.0 IRRITABLE BOWEL SYNDROME WITH DIARRHEA: ICD-10-CM

## 2019-05-15 DIAGNOSIS — G43.111 INTRACTABLE MIGRAINE WITH AURA WITH STATUS MIGRAINOSUS: Primary | ICD-10-CM

## 2019-05-15 DIAGNOSIS — H61.22 LEFT EAR IMPACTED CERUMEN: ICD-10-CM

## 2019-05-15 PROCEDURE — 69209 REMOVE IMPACTED EAR WAX UNI: CPT | Performed by: FAMILY MEDICINE

## 2019-05-15 PROCEDURE — 99214 OFFICE O/P EST MOD 30 MIN: CPT | Performed by: FAMILY MEDICINE

## 2019-05-15 PROCEDURE — G8427 DOCREV CUR MEDS BY ELIG CLIN: HCPCS | Performed by: FAMILY MEDICINE

## 2019-05-15 PROCEDURE — 1036F TOBACCO NON-USER: CPT | Performed by: FAMILY MEDICINE

## 2019-05-15 PROCEDURE — G8419 CALC BMI OUT NRM PARAM NOF/U: HCPCS | Performed by: FAMILY MEDICINE

## 2019-05-15 RX ORDER — PROMETHAZINE HYDROCHLORIDE 25 MG/1
25 TABLET ORAL EVERY 6 HOURS PRN
Qty: 30 TABLET | Refills: 5 | Status: SHIPPED | OUTPATIENT
Start: 2019-05-15 | End: 2019-05-22

## 2019-05-15 RX ORDER — CYCLOBENZAPRINE HCL 10 MG
10 TABLET ORAL 3 TIMES DAILY PRN
Qty: 90 TABLET | Refills: 1 | Status: SHIPPED | OUTPATIENT
Start: 2019-05-15 | End: 2019-06-07 | Stop reason: SDUPTHER

## 2019-05-15 NOTE — PROGRESS NOTES
Explained ear wax removal procedure to patient with patient verbalizing understanding. Right irrigation performed with warm irrigation fluid, noting small amount of cerumen flushed from right ear successfully. Patient tolerated well. Ear canals now clear, tympanic membranes visible.

## 2019-05-15 NOTE — PROGRESS NOTES
Mouth/Throat: Uvula is midline, oropharynx is clear and moist and mucous membranes are normal. Mucous membranes are not pale. Normal dentition. No posterior oropharyngeal edema or posterior oropharyngeal erythema. Eyes: Lids are normal. Right eye exhibits no chemosis and no discharge. Left eye exhibits no chemosis and no drainage. Right conjunctiva has no hemorrhage. Left conjunctiva has no hemorrhage. Right eye exhibits normal extraocular motion. Left eye exhibits normal extraocular motion. Right pupil is round and reactive. Left pupil is round and reactive. Pupils are equal.   Cardiovascular: Normal rate, regular rhythm, S1 normal, S2 normal and normal heart sounds. Exam reveals no gallop. No murmur heard. Pulmonary/Chest: Effort normal and breath sounds normal. No respiratory distress. She has no wheezes. She has no rhonchi. She has no rales. Abdominal: Soft. Normal appearance and bowel sounds are normal. She exhibits no distension and no mass. There is no hepatosplenomegaly. No tenderness. She has no rigidity, no rebound and no guarding. No hernia. Musculoskeletal:        Right lower leg: She exhibits no edema. Left lower leg: She exhibits no edema. Neurological: She is alert. Ceruminosis is noted. Wax is removed by syringing and manual debridement by ronnie. Instructions for home care to prevent wax buildup are given. Assessment:      Luz Maria Kessler was seen today for medication refill. Diagnoses and all orders for this visit:    Intractable migraine with aura with status migrainosus  -     promethazine (PHENERGAN) 25 MG tablet; Take 1 tablet by mouth every 6 hours as needed for Nausea    Irritable bowel syndrome with diarrhea  -     cyclobenzaprine (FLEXERIL) 10 MG tablet;  Take 1 tablet by mouth 3 times daily as needed for Muscle spasms    Left ear impacted cerumen  -     FL REMOVAL IMPACTED CERUMEN IRRIGATION/LVG UNILAT    History of multiple miscarriages    Stress reaction      Continue zoloft and reviewed side effects are likely temporary  Continue healthy diet and exercise  Counseling through journaling or formal counseling    Call or return to clinic prn if these symptoms worsen or fail to improve as anticipated.       Jase Pacheco MD

## 2019-06-06 DIAGNOSIS — K58.0 IRRITABLE BOWEL SYNDROME WITH DIARRHEA: ICD-10-CM

## 2019-06-07 RX ORDER — LABETALOL 100 MG/1
50 TABLET, FILM COATED ORAL 2 TIMES DAILY
Qty: 30 TABLET | Refills: 3 | Status: ON HOLD | OUTPATIENT
Start: 2019-06-07 | End: 2020-06-16 | Stop reason: ALTCHOICE

## 2019-06-07 RX ORDER — CYCLOBENZAPRINE HCL 10 MG
10 TABLET ORAL 3 TIMES DAILY PRN
Qty: 90 TABLET | Refills: 1 | Status: SHIPPED | OUTPATIENT
Start: 2019-06-07 | End: 2022-03-03 | Stop reason: SDUPTHER

## 2019-06-17 ENCOUNTER — HOSPITAL ENCOUNTER (OUTPATIENT)
Age: 34
Discharge: HOME | End: 2019-06-17
Payer: COMMERCIAL

## 2019-06-17 LAB — PROGESTERONE LEVEL: 6.81 NG/ML

## 2019-06-17 PROCEDURE — 36415 COLL VENOUS BLD VENIPUNCTURE: CPT

## 2019-06-17 PROCEDURE — 84144 ASSAY OF PROGESTERONE: CPT

## 2019-07-11 ENCOUNTER — HOSPITAL ENCOUNTER (OUTPATIENT)
Age: 34
Discharge: HOME | End: 2019-07-11
Payer: COMMERCIAL

## 2019-07-11 ENCOUNTER — HOSPITAL ENCOUNTER (OUTPATIENT)
Dept: GENERAL RADIOLOGY | Age: 34
Discharge: HOME | End: 2019-07-11
Payer: COMMERCIAL

## 2019-07-11 DIAGNOSIS — N97.9 FEMALE FERTILITY PROBLEM: ICD-10-CM

## 2019-07-11 LAB — PREGNANCY, SERUM: NEGATIVE

## 2019-07-11 PROCEDURE — 58340 CATHETER FOR HYSTEROGRAPHY: CPT

## 2019-07-11 PROCEDURE — 6360000004 HC RX CONTRAST MEDICATION: Performed by: OBSTETRICS & GYNECOLOGY

## 2019-07-11 PROCEDURE — 84703 CHORIONIC GONADOTROPIN ASSAY: CPT

## 2019-07-11 PROCEDURE — 74740 X-RAY FEMALE GENITAL TRACT: CPT

## 2019-07-11 PROCEDURE — 36415 COLL VENOUS BLD VENIPUNCTURE: CPT

## 2019-07-11 RX ADMIN — IOHEXOL 10 ML: 240 INJECTION, SOLUTION INTRATHECAL; INTRAVASCULAR; INTRAVENOUS; ORAL at 13:32

## 2019-09-16 ENCOUNTER — NURSE ONLY (OUTPATIENT)
Dept: FAMILY MEDICINE CLINIC | Age: 34
End: 2019-09-16
Payer: COMMERCIAL

## 2019-09-16 DIAGNOSIS — N30.00 ACUTE CYSTITIS WITHOUT HEMATURIA: Primary | ICD-10-CM

## 2019-09-16 DIAGNOSIS — R30.0 DYSURIA: ICD-10-CM

## 2019-09-16 LAB
BILIRUBIN URINE: NEGATIVE
BLOOD URINE, POC: NEGATIVE
CHARACTER, URINE: ABNORMAL
COLOR, URINE: YELLOW
GLUCOSE URINE: NEGATIVE MG/DL
KETONES, URINE: NEGATIVE
LEUKOCYTE CLUMPS, URINE: ABNORMAL
NITRITE, URINE: NEGATIVE
PH, URINE: 5.5 (ref 5–9)
PROTEIN, URINE: NEGATIVE MG/DL
SPECIFIC GRAVITY, URINE: 1.01 (ref 1–1.03)
UROBILINOGEN, URINE: 0.2 EU/DL (ref 0–1)

## 2019-09-16 PROCEDURE — 81003 URINALYSIS AUTO W/O SCOPE: CPT | Performed by: FAMILY MEDICINE

## 2019-09-16 PROCEDURE — 99211 OFF/OP EST MAY X REQ PHY/QHP: CPT | Performed by: FAMILY MEDICINE

## 2019-09-16 RX ORDER — SULFAMETHOXAZOLE AND TRIMETHOPRIM 800; 160 MG/1; MG/1
1 TABLET ORAL 2 TIMES DAILY
Qty: 20 TABLET | Refills: 0 | Status: SHIPPED | OUTPATIENT
Start: 2019-09-16 | End: 2019-09-26

## 2019-09-16 NOTE — PROGRESS NOTES
No chief complaint on file.       Results for POC orders placed in visit on 09/16/19   POCT Urinalysis No Micro (Auto)   Result Value Ref Range    Glucose, Ur Negative NEGATIVE mg/dl    Bilirubin Urine Negative     Ketones, Urine Negative NEGATIVE    Specific Gravity, Urine 1.015 1.002 - 1.03    Blood, UA POC Negative NEGATIVE    pH, Urine 5.50 5.0 - 9.0    Protein, Urine Negative NEGATIVE mg/dl    Urobilinogen, Urine 0.20 0.0 - 1.0 eu/dl    Nitrite, Urine Negative NEGATIVE    Leukocyte Clumps, Urine Moderate (A) NEGATIVE    Color, Urine Yellow YELLOW-STR    Character, Urine Slightly Cloudy CLR-SL.[id number]     UTI  Start antibiotic  Push water and cranberry juice  Recheck urine in 2 weeks  Call patient

## 2019-09-16 NOTE — PROGRESS NOTES
Patient c/o dysuria, frequent urination, lower abdominal pain x 1 week. Patient reports \"lots of itching and burning. \"     [facility]

## 2019-09-18 LAB
ORGANISM: ABNORMAL
URINE CULTURE, ROUTINE: ABNORMAL

## 2019-09-30 ENCOUNTER — NURSE ONLY (OUTPATIENT)
Dept: FAMILY MEDICINE CLINIC | Age: 34
End: 2019-09-30
Payer: COMMERCIAL

## 2019-09-30 DIAGNOSIS — N39.0 URINARY TRACT INFECTION WITHOUT HEMATURIA, SITE UNSPECIFIED: Primary | ICD-10-CM

## 2019-09-30 LAB
BILIRUBIN URINE: NEGATIVE
BLOOD URINE, POC: NEGATIVE
CHARACTER, URINE: CLEAR
COLOR, URINE: NORMAL
GLUCOSE URINE: NEGATIVE MG/DL
KETONES, URINE: NEGATIVE
LEUKOCYTE CLUMPS, URINE: NEGATIVE
NITRITE, URINE: NEGATIVE
PH, URINE: 7 (ref 5–9)
PROTEIN, URINE: NEGATIVE MG/DL
SPECIFIC GRAVITY, URINE: 1.01 (ref 1–1.03)
UROBILINOGEN, URINE: 0.2 EU/DL (ref 0–1)

## 2019-09-30 PROCEDURE — 99211 OFF/OP EST MAY X REQ PHY/QHP: CPT | Performed by: FAMILY MEDICINE

## 2019-09-30 PROCEDURE — 81003 URINALYSIS AUTO W/O SCOPE: CPT | Performed by: FAMILY MEDICINE

## 2019-09-30 NOTE — PROGRESS NOTES
Chief Complaint   Patient presents with    Urinary Tract Infection       Results for POC orders placed in visit on 09/30/19   POCT Urinalysis No Micro (Auto)   Result Value Ref Range    Glucose, Ur Negative NEGATIVE mg/dl    Bilirubin Urine Negative     Ketones, Urine Negative NEGATIVE    Specific Gravity, Urine 1.010 1.002 - 1.03    Blood, UA POC Negative NEGATIVE    pH, Urine 7.00 5.0 - 9.0    Protein, Urine Negative NEGATIVE mg/dl    Urobilinogen, Urine 0.20 0.0 - 1.0 eu/dl    Nitrite, Urine Negative NEGATIVE    Leukocyte Clumps, Urine Negative NEGATIVE    Color, Urine Light yellow YELLOW-STR    Character, Urine Clear CLR-SL.[id number]       Urine clear  No changes to med  Call patient

## 2019-10-21 ENCOUNTER — HOSPITAL ENCOUNTER (OUTPATIENT)
Age: 34
Discharge: HOME | End: 2019-10-21
Payer: COMMERCIAL

## 2019-10-21 LAB — HCG,BETA SUBUNIT,QUAL,SERUM: 432.8 MIU/ML (ref 0–5)

## 2019-10-21 PROCEDURE — 84702 CHORIONIC GONADOTROPIN TEST: CPT

## 2019-10-21 PROCEDURE — 36415 COLL VENOUS BLD VENIPUNCTURE: CPT

## 2019-10-23 ENCOUNTER — HOSPITAL ENCOUNTER (OUTPATIENT)
Age: 34
Discharge: HOME | End: 2019-10-23
Payer: COMMERCIAL

## 2019-10-23 LAB — HCG,BETA SUBUNIT,QUAL,SERUM: 932.6 MIU/ML (ref 0–5)

## 2019-10-23 PROCEDURE — 36415 COLL VENOUS BLD VENIPUNCTURE: CPT

## 2019-10-23 PROCEDURE — 84702 CHORIONIC GONADOTROPIN TEST: CPT

## 2019-10-25 ENCOUNTER — HOSPITAL ENCOUNTER (OUTPATIENT)
Age: 34
Discharge: HOME | End: 2019-10-25
Payer: COMMERCIAL

## 2019-10-25 LAB — HCG,BETA SUBUNIT,QUAL,SERUM: 1807 MIU/ML (ref 0–5)

## 2019-10-25 PROCEDURE — 36415 COLL VENOUS BLD VENIPUNCTURE: CPT

## 2019-10-25 PROCEDURE — 84702 CHORIONIC GONADOTROPIN TEST: CPT

## 2020-02-18 RX ORDER — OSELTAMIVIR PHOSPHATE 75 MG/1
75 CAPSULE ORAL DAILY
Qty: 10 CAPSULE | Refills: 0 | Status: SHIPPED | OUTPATIENT
Start: 2020-02-18 | End: 2020-02-28

## 2020-05-07 ENCOUNTER — HOSPITAL ENCOUNTER (OUTPATIENT)
Age: 35
Discharge: HOME | End: 2020-05-07
Payer: COMMERCIAL

## 2020-05-07 LAB
ALT SERPL-CCNC: 14 U/L (ref 11–66)
AST SERPL-CCNC: 15 U/L (ref 5–40)
BUN BLDV-MCNC: 7 MG/DL (ref 7–22)
CREAT SERPL-MCNC: 0.5 MG/DL (ref 0.4–1.2)
CREATININE URINE: 16.7 MG/DL
GFR SERPL CREATININE-BSD FRML MDRD: > 90 ML/MIN/1.73M2
PLATELET # BLD: 163 THOU/MM3 (ref 130–400)
PROT/CREAT RATIO, UR: 0.24
PROTEIN, URINE: < 4 MG/DL
URIC ACID: 2.8 MG/DL (ref 2.4–5.7)

## 2020-05-07 PROCEDURE — 84156 ASSAY OF PROTEIN URINE: CPT

## 2020-05-07 PROCEDURE — 82570 ASSAY OF URINE CREATININE: CPT

## 2020-05-07 PROCEDURE — 84450 TRANSFERASE (AST) (SGOT): CPT

## 2020-05-07 PROCEDURE — 36415 COLL VENOUS BLD VENIPUNCTURE: CPT

## 2020-05-07 PROCEDURE — 85049 AUTOMATED PLATELET COUNT: CPT

## 2020-05-07 PROCEDURE — 84550 ASSAY OF BLOOD/URIC ACID: CPT

## 2020-05-07 PROCEDURE — 84520 ASSAY OF UREA NITROGEN: CPT

## 2020-05-07 PROCEDURE — 84460 ALANINE AMINO (ALT) (SGPT): CPT

## 2020-05-07 PROCEDURE — 82565 ASSAY OF CREATININE: CPT

## 2020-05-20 ENCOUNTER — TELEPHONE (OUTPATIENT)
Dept: FAMILY MEDICINE CLINIC | Age: 35
End: 2020-05-20

## 2020-05-20 NOTE — TELEPHONE ENCOUNTER
Pt is wondering if vaccinations are available and does the office do T dap booster in the office. Please advise.

## 2020-05-21 ENCOUNTER — NURSE ONLY (OUTPATIENT)
Dept: FAMILY MEDICINE CLINIC | Age: 35
End: 2020-05-21
Payer: COMMERCIAL

## 2020-05-21 PROCEDURE — 90715 TDAP VACCINE 7 YRS/> IM: CPT | Performed by: NURSE PRACTITIONER

## 2020-05-21 PROCEDURE — 90471 IMMUNIZATION ADMIN: CPT | Performed by: NURSE PRACTITIONER

## 2020-06-15 ENCOUNTER — HOSPITAL ENCOUNTER (OUTPATIENT)
Age: 35
Discharge: HOME | End: 2020-06-15
Payer: COMMERCIAL

## 2020-06-15 LAB
ABO: NORMAL
ANTIBODY SCREEN: NORMAL
BASOPHILS # BLD: 0.5 %
BASOPHILS ABSOLUTE: 0 THOU/MM3 (ref 0–0.1)
EOSINOPHIL # BLD: 0.2 %
EOSINOPHILS ABSOLUTE: 0 THOU/MM3 (ref 0–0.4)
ERYTHROCYTE [DISTWIDTH] IN BLOOD BY AUTOMATED COUNT: 15 % (ref 11.5–14.5)
ERYTHROCYTE [DISTWIDTH] IN BLOOD BY AUTOMATED COUNT: 47.9 FL (ref 35–45)
HCT VFR BLD CALC: 39.7 % (ref 37–47)
HEMOGLOBIN: 12.6 GM/DL (ref 12–16)
IMMATURE GRANS (ABS): 0.1 THOU/MM3 (ref 0–0.07)
IMMATURE GRANULOCYTES: 1.2 %
LYMPHOCYTES # BLD: 14.9 %
LYMPHOCYTES ABSOLUTE: 1.3 THOU/MM3 (ref 1–4.8)
MCH RBC QN AUTO: 27.9 PG (ref 26–33)
MCHC RBC AUTO-ENTMCNC: 31.7 GM/DL (ref 32.2–35.5)
MCV RBC AUTO: 87.8 FL (ref 81–99)
MONOCYTES # BLD: 8.9 %
MONOCYTES ABSOLUTE: 0.8 THOU/MM3 (ref 0.4–1.3)
NUCLEATED RED BLOOD CELLS: 0 /100 WBC
PLATELET # BLD: 154 THOU/MM3 (ref 130–400)
PMV BLD AUTO: 12 FL (ref 9.4–12.4)
RBC # BLD: 4.52 MILL/MM3 (ref 4.2–5.4)
RH FACTOR: NORMAL
SEG NEUTROPHILS: 74.3 %
SEGMENTED NEUTROPHILS ABSOLUTE COUNT: 6.5 THOU/MM3 (ref 1.8–7.7)
WBC # BLD: 8.7 THOU/MM3 (ref 4.8–10.8)

## 2020-06-15 PROCEDURE — 36415 COLL VENOUS BLD VENIPUNCTURE: CPT

## 2020-06-15 PROCEDURE — 86850 RBC ANTIBODY SCREEN: CPT

## 2020-06-15 PROCEDURE — 86592 SYPHILIS TEST NON-TREP QUAL: CPT

## 2020-06-15 PROCEDURE — 86900 BLOOD TYPING SEROLOGIC ABO: CPT

## 2020-06-15 PROCEDURE — 85025 COMPLETE CBC W/AUTO DIFF WBC: CPT

## 2020-06-15 PROCEDURE — 86901 BLOOD TYPING SEROLOGIC RH(D): CPT

## 2020-06-16 ENCOUNTER — ANESTHESIA EVENT (OUTPATIENT)
Dept: LABOR AND DELIVERY | Age: 35
End: 2020-06-16
Payer: COMMERCIAL

## 2020-06-16 ENCOUNTER — ANESTHESIA (OUTPATIENT)
Dept: LABOR AND DELIVERY | Age: 35
End: 2020-06-16
Payer: COMMERCIAL

## 2020-06-16 ENCOUNTER — HOSPITAL ENCOUNTER (INPATIENT)
Age: 35
LOS: 2 days | Discharge: HOME | End: 2020-06-18
Attending: OBSTETRICS & GYNECOLOGY | Admitting: OBSTETRICS & GYNECOLOGY
Payer: COMMERCIAL

## 2020-06-16 VITALS — SYSTOLIC BLOOD PRESSURE: 146 MMHG | OXYGEN SATURATION: 97 % | DIASTOLIC BLOOD PRESSURE: 66 MMHG

## 2020-06-16 LAB
AMPHETAMINE+METHAMPHETAMINE URINE SCREEN: NEGATIVE
BARBITURATE QUANTITATIVE URINE: NEGATIVE
BENZODIAZEPINE QUANTITATIVE URINE: NEGATIVE
CANNABINOID QUANTITATIVE URINE: NEGATIVE
COCAINE METABOLITE QUANTITATIVE URINE: NEGATIVE
OPIATES, URINE: NEGATIVE
OXYCODONE: NEGATIVE
PHENCYCLIDINE QUANTITATIVE URINE: NEGATIVE
RPR: NONREACTIVE

## 2020-06-16 PROCEDURE — 6360000002 HC RX W HCPCS: Performed by: NURSE ANESTHETIST, CERTIFIED REGISTERED

## 2020-06-16 PROCEDURE — 2580000003 HC RX 258: Performed by: OBSTETRICS & GYNECOLOGY

## 2020-06-16 PROCEDURE — 80305 DRUG TEST PRSMV DIR OPT OBS: CPT

## 2020-06-16 PROCEDURE — 6370000000 HC RX 637 (ALT 250 FOR IP): Performed by: ANESTHESIOLOGY

## 2020-06-16 PROCEDURE — 6360000002 HC RX W HCPCS: Performed by: OBSTETRICS & GYNECOLOGY

## 2020-06-16 PROCEDURE — 2709999900 HC NON-CHARGEABLE SUPPLY

## 2020-06-16 PROCEDURE — 6370000000 HC RX 637 (ALT 250 FOR IP): Performed by: OBSTETRICS & GYNECOLOGY

## 2020-06-16 PROCEDURE — 2500000003 HC RX 250 WO HCPCS: Performed by: OBSTETRICS & GYNECOLOGY

## 2020-06-16 PROCEDURE — 1220000000 HC SEMI PRIVATE OB R&B

## 2020-06-16 PROCEDURE — 6360000002 HC RX W HCPCS

## 2020-06-16 PROCEDURE — 7100000000 HC PACU RECOVERY - FIRST 15 MIN: Performed by: OBSTETRICS & GYNECOLOGY

## 2020-06-16 PROCEDURE — 2580000003 HC RX 258: Performed by: NURSE ANESTHETIST, CERTIFIED REGISTERED

## 2020-06-16 PROCEDURE — 3609079900 HC CESAREAN SECTION: Performed by: OBSTETRICS & GYNECOLOGY

## 2020-06-16 PROCEDURE — 6360000002 HC RX W HCPCS: Performed by: ANESTHESIOLOGY

## 2020-06-16 PROCEDURE — 3700000001 HC ADD 15 MINUTES (ANESTHESIA): Performed by: OBSTETRICS & GYNECOLOGY

## 2020-06-16 PROCEDURE — 2709999900 HC NON-CHARGEABLE SUPPLY: Performed by: OBSTETRICS & GYNECOLOGY

## 2020-06-16 PROCEDURE — 2500000003 HC RX 250 WO HCPCS: Performed by: NURSE ANESTHETIST, CERTIFIED REGISTERED

## 2020-06-16 PROCEDURE — 3700000000 HC ANESTHESIA ATTENDED CARE: Performed by: OBSTETRICS & GYNECOLOGY

## 2020-06-16 PROCEDURE — 7100000001 HC PACU RECOVERY - ADDTL 15 MIN: Performed by: OBSTETRICS & GYNECOLOGY

## 2020-06-16 RX ORDER — HYDROCODONE BITARTRATE AND ACETAMINOPHEN 5; 325 MG/1; MG/1
1 TABLET ORAL EVERY 4 HOURS PRN
Status: DISCONTINUED | OUTPATIENT
Start: 2020-06-17 | End: 2020-06-18 | Stop reason: HOSPADM

## 2020-06-16 RX ORDER — PSEUDOEPHEDRINE HYDROCHLORIDE 30 MG/1
30 TABLET ORAL EVERY 4 HOURS PRN
COMMUNITY
End: 2022-10-13 | Stop reason: SDUPTHER

## 2020-06-16 RX ORDER — SIMETHICONE 80 MG
80 TABLET,CHEWABLE ORAL EVERY 6 HOURS PRN
Status: DISCONTINUED | OUTPATIENT
Start: 2020-06-16 | End: 2020-06-18 | Stop reason: HOSPADM

## 2020-06-16 RX ORDER — ACETAMINOPHEN 325 MG/1
975 TABLET ORAL ONCE
Status: DISCONTINUED | OUTPATIENT
Start: 2020-06-16 | End: 2020-06-16 | Stop reason: HOSPADM

## 2020-06-16 RX ORDER — METOCLOPRAMIDE HYDROCHLORIDE 5 MG/ML
10 INJECTION INTRAMUSCULAR; INTRAVENOUS ONCE
Status: COMPLETED | OUTPATIENT
Start: 2020-06-16 | End: 2020-06-16

## 2020-06-16 RX ORDER — SODIUM CHLORIDE 0.9 % (FLUSH) 0.9 %
10 SYRINGE (ML) INJECTION PRN
Status: CANCELLED | OUTPATIENT
Start: 2020-06-16

## 2020-06-16 RX ORDER — KETOROLAC TROMETHAMINE 30 MG/ML
30 INJECTION, SOLUTION INTRAMUSCULAR; INTRAVENOUS EVERY 6 HOURS PRN
Status: DISPENSED | OUTPATIENT
Start: 2020-06-16 | End: 2020-06-17

## 2020-06-16 RX ORDER — FENTANYL CITRATE 50 UG/ML
INJECTION, SOLUTION INTRAMUSCULAR; INTRAVENOUS PRN
Status: DISCONTINUED | OUTPATIENT
Start: 2020-06-16 | End: 2020-06-16 | Stop reason: SDUPTHER

## 2020-06-16 RX ORDER — OXYTOCIN 10 [USP'U]/ML
INJECTION, SOLUTION INTRAMUSCULAR; INTRAVENOUS PRN
Status: DISCONTINUED | OUTPATIENT
Start: 2020-06-16 | End: 2020-06-16 | Stop reason: SDUPTHER

## 2020-06-16 RX ORDER — TRISODIUM CITRATE DIHYDRATE AND CITRIC ACID MONOHYDRATE 500; 334 MG/5ML; MG/5ML
15 SOLUTION ORAL ONCE
Status: COMPLETED | OUTPATIENT
Start: 2020-06-16 | End: 2020-06-16

## 2020-06-16 RX ORDER — ONDANSETRON 4 MG/1
8 TABLET, FILM COATED ORAL EVERY 8 HOURS PRN
Status: DISCONTINUED | OUTPATIENT
Start: 2020-06-17 | End: 2020-06-18 | Stop reason: HOSPADM

## 2020-06-16 RX ORDER — ONDANSETRON 2 MG/ML
INJECTION INTRAMUSCULAR; INTRAVENOUS PRN
Status: DISCONTINUED | OUTPATIENT
Start: 2020-06-16 | End: 2020-06-16 | Stop reason: SDUPTHER

## 2020-06-16 RX ORDER — ACETAMINOPHEN 325 MG/1
325 TABLET ORAL EVERY 4 HOURS PRN
Status: DISPENSED | OUTPATIENT
Start: 2020-06-16 | End: 2020-06-17

## 2020-06-16 RX ORDER — IBUPROFEN 800 MG/1
800 TABLET ORAL EVERY 8 HOURS
Status: DISCONTINUED | OUTPATIENT
Start: 2020-06-18 | End: 2020-06-17

## 2020-06-16 RX ORDER — SODIUM CHLORIDE, SODIUM LACTATE, POTASSIUM CHLORIDE, CALCIUM CHLORIDE 600; 310; 30; 20 MG/100ML; MG/100ML; MG/100ML; MG/100ML
INJECTION, SOLUTION INTRAVENOUS CONTINUOUS
Status: DISCONTINUED | OUTPATIENT
Start: 2020-06-16 | End: 2020-06-18 | Stop reason: HOSPADM

## 2020-06-16 RX ORDER — KETOROLAC TROMETHAMINE 30 MG/ML
30 INJECTION, SOLUTION INTRAMUSCULAR; INTRAVENOUS EVERY 6 HOURS
Status: DISCONTINUED | OUTPATIENT
Start: 2020-06-17 | End: 2020-06-17

## 2020-06-16 RX ORDER — PRENATAL WITH FERROUS FUM AND FOLIC ACID 3080; 920; 120; 400; 22; 1.84; 3; 20; 10; 1; 12; 200; 27; 25; 2 [IU]/1; [IU]/1; MG/1; [IU]/1; MG/1; MG/1; MG/1; MG/1; MG/1; MG/1; UG/1; MG/1; MG/1; MG/1; MG/1
1 TABLET ORAL DAILY
Status: DISCONTINUED | OUTPATIENT
Start: 2020-06-17 | End: 2020-06-18 | Stop reason: HOSPADM

## 2020-06-16 RX ORDER — FERROUS SULFATE 325(65) MG
325 TABLET ORAL
Status: DISCONTINUED | OUTPATIENT
Start: 2020-06-17 | End: 2020-06-18 | Stop reason: HOSPADM

## 2020-06-16 RX ORDER — ONDANSETRON 2 MG/ML
4 INJECTION INTRAMUSCULAR; INTRAVENOUS EVERY 6 HOURS PRN
Status: DISCONTINUED | OUTPATIENT
Start: 2020-06-16 | End: 2020-06-16 | Stop reason: HOSPADM

## 2020-06-16 RX ORDER — BISACODYL 10 MG
10 SUPPOSITORY, RECTAL RECTAL DAILY PRN
Status: DISCONTINUED | OUTPATIENT
Start: 2020-06-16 | End: 2020-06-18 | Stop reason: HOSPADM

## 2020-06-16 RX ORDER — DOCUSATE SODIUM 100 MG/1
100 CAPSULE, LIQUID FILLED ORAL 2 TIMES DAILY
Status: DISCONTINUED | OUTPATIENT
Start: 2020-06-16 | End: 2020-06-18 | Stop reason: HOSPADM

## 2020-06-16 RX ORDER — SODIUM CHLORIDE 0.9 % (FLUSH) 0.9 %
10 SYRINGE (ML) INJECTION EVERY 12 HOURS SCHEDULED
Status: DISCONTINUED | OUTPATIENT
Start: 2020-06-16 | End: 2020-06-18 | Stop reason: HOSPADM

## 2020-06-16 RX ORDER — MORPHINE SULFATE 0.5 MG/ML
INJECTION, SOLUTION EPIDURAL; INTRATHECAL; INTRAVENOUS PRN
Status: DISCONTINUED | OUTPATIENT
Start: 2020-06-16 | End: 2020-06-16 | Stop reason: SDUPTHER

## 2020-06-16 RX ORDER — ONDANSETRON 2 MG/ML
4 INJECTION INTRAMUSCULAR; INTRAVENOUS EVERY 6 HOURS PRN
Status: ACTIVE | OUTPATIENT
Start: 2020-06-16 | End: 2020-06-17

## 2020-06-16 RX ORDER — NALOXONE HYDROCHLORIDE 0.4 MG/ML
0.4 INJECTION, SOLUTION INTRAMUSCULAR; INTRAVENOUS; SUBCUTANEOUS PRN
Status: ACTIVE | OUTPATIENT
Start: 2020-06-16 | End: 2020-06-17

## 2020-06-16 RX ORDER — HYDROCODONE BITARTRATE AND ACETAMINOPHEN 5; 325 MG/1; MG/1
1 TABLET ORAL ONCE
Status: COMPLETED | OUTPATIENT
Start: 2020-06-16 | End: 2020-06-16

## 2020-06-16 RX ORDER — MODIFIED LANOLIN
OINTMENT (GRAM) TOPICAL
Status: DISCONTINUED | OUTPATIENT
Start: 2020-06-16 | End: 2020-06-18 | Stop reason: HOSPADM

## 2020-06-16 RX ORDER — HYDROCODONE BITARTRATE AND ACETAMINOPHEN 5; 325 MG/1; MG/1
1 TABLET ORAL EVERY 4 HOURS PRN
Status: DISPENSED | OUTPATIENT
Start: 2020-06-16 | End: 2020-06-17

## 2020-06-16 RX ORDER — ONDANSETRON 2 MG/ML
4 INJECTION INTRAMUSCULAR; INTRAVENOUS EVERY 6 HOURS PRN
Status: DISCONTINUED | OUTPATIENT
Start: 2020-06-17 | End: 2020-06-18 | Stop reason: HOSPADM

## 2020-06-16 RX ORDER — BUPIVACAINE HYDROCHLORIDE 7.5 MG/ML
INJECTION, SOLUTION INTRASPINAL PRN
Status: DISCONTINUED | OUTPATIENT
Start: 2020-06-16 | End: 2020-06-16 | Stop reason: SDUPTHER

## 2020-06-16 RX ORDER — SODIUM CHLORIDE, SODIUM LACTATE, POTASSIUM CHLORIDE, CALCIUM CHLORIDE 600; 310; 30; 20 MG/100ML; MG/100ML; MG/100ML; MG/100ML
INJECTION, SOLUTION INTRAVENOUS CONTINUOUS PRN
Status: DISCONTINUED | OUTPATIENT
Start: 2020-06-16 | End: 2020-06-16 | Stop reason: SDUPTHER

## 2020-06-16 RX ORDER — HYDROCODONE BITARTRATE AND ACETAMINOPHEN 5; 325 MG/1; MG/1
2 TABLET ORAL EVERY 4 HOURS PRN
Status: DISCONTINUED | OUTPATIENT
Start: 2020-06-17 | End: 2020-06-18 | Stop reason: HOSPADM

## 2020-06-16 RX ORDER — ACETAMINOPHEN 500 MG
1000 TABLET ORAL EVERY 8 HOURS
Status: DISCONTINUED | OUTPATIENT
Start: 2020-06-17 | End: 2020-06-17

## 2020-06-16 RX ORDER — ASPIRIN 81 MG/1
81 TABLET ORAL DAILY
Status: ON HOLD | COMMUNITY
End: 2020-06-18 | Stop reason: HOSPADM

## 2020-06-16 RX ORDER — DIPHENHYDRAMINE HYDROCHLORIDE 50 MG/ML
25 INJECTION INTRAMUSCULAR; INTRAVENOUS EVERY 6 HOURS PRN
Status: ACTIVE | OUTPATIENT
Start: 2020-06-16 | End: 2020-06-17

## 2020-06-16 RX ORDER — DIPHENHYDRAMINE HYDROCHLORIDE 50 MG/ML
25 INJECTION INTRAMUSCULAR; INTRAVENOUS EVERY 6 HOURS PRN
Status: DISCONTINUED | OUTPATIENT
Start: 2020-06-17 | End: 2020-06-18 | Stop reason: HOSPADM

## 2020-06-16 RX ORDER — HYDROCODONE BITARTRATE AND ACETAMINOPHEN 5; 325 MG/1; MG/1
2 TABLET ORAL EVERY 4 HOURS PRN
Status: ACTIVE | OUTPATIENT
Start: 2020-06-16 | End: 2020-06-17

## 2020-06-16 RX ORDER — SODIUM CHLORIDE 0.9 % (FLUSH) 0.9 %
10 SYRINGE (ML) INJECTION EVERY 12 HOURS SCHEDULED
Status: CANCELLED | OUTPATIENT
Start: 2020-06-16

## 2020-06-16 RX ORDER — CARBOPROST TROMETHAMINE 250 UG/ML
250 INJECTION, SOLUTION INTRAMUSCULAR PRN
Status: DISCONTINUED | OUTPATIENT
Start: 2020-06-16 | End: 2020-06-18 | Stop reason: HOSPADM

## 2020-06-16 RX ORDER — METHYLERGONOVINE MALEATE 0.2 MG/ML
200 INJECTION INTRAVENOUS PRN
Status: DISCONTINUED | OUTPATIENT
Start: 2020-06-16 | End: 2020-06-18 | Stop reason: HOSPADM

## 2020-06-16 RX ORDER — SODIUM CHLORIDE 0.9 % (FLUSH) 0.9 %
10 SYRINGE (ML) INJECTION PRN
Status: DISCONTINUED | OUTPATIENT
Start: 2020-06-16 | End: 2020-06-18 | Stop reason: HOSPADM

## 2020-06-16 RX ORDER — SODIUM CHLORIDE, SODIUM LACTATE, POTASSIUM CHLORIDE, AND CALCIUM CHLORIDE .6; .31; .03; .02 G/100ML; G/100ML; G/100ML; G/100ML
1000 INJECTION, SOLUTION INTRAVENOUS ONCE
Status: CANCELLED | OUTPATIENT
Start: 2020-06-16 | End: 2020-06-16

## 2020-06-16 RX ORDER — KETOROLAC TROMETHAMINE 30 MG/ML
INJECTION, SOLUTION INTRAMUSCULAR; INTRAVENOUS PRN
Status: DISCONTINUED | OUTPATIENT
Start: 2020-06-16 | End: 2020-06-16 | Stop reason: SDUPTHER

## 2020-06-16 RX ORDER — MISOPROSTOL 200 UG/1
800 TABLET ORAL PRN
Status: DISCONTINUED | OUTPATIENT
Start: 2020-06-16 | End: 2020-06-18 | Stop reason: HOSPADM

## 2020-06-16 RX ADMIN — HYDROCODONE BITARTRATE AND ACETAMINOPHEN 1 TABLET: 5; 325 TABLET ORAL at 14:29

## 2020-06-16 RX ADMIN — PHENYLEPHRINE HYDROCHLORIDE 200 MCG: 10 INJECTION INTRAVENOUS at 12:31

## 2020-06-16 RX ADMIN — BUPIVACAINE HYDROCHLORIDE 1.6 ML: 7.5 INJECTION, SOLUTION SUBARACHNOID at 12:15

## 2020-06-16 RX ADMIN — SODIUM CHLORIDE, POTASSIUM CHLORIDE, SODIUM LACTATE AND CALCIUM CHLORIDE: 600; 310; 30; 20 INJECTION, SOLUTION INTRAVENOUS at 09:40

## 2020-06-16 RX ADMIN — SODIUM CHLORIDE, POTASSIUM CHLORIDE, SODIUM LACTATE AND CALCIUM CHLORIDE: 600; 310; 30; 20 INJECTION, SOLUTION INTRAVENOUS at 12:02

## 2020-06-16 RX ADMIN — OXYTOCIN 20 UNITS: 10 INJECTION, SOLUTION INTRAMUSCULAR; INTRAVENOUS at 12:34

## 2020-06-16 RX ADMIN — CEFAZOLIN 2 G: 10 INJECTION, POWDER, FOR SOLUTION INTRAVENOUS at 11:55

## 2020-06-16 RX ADMIN — SODIUM CITRATE AND CITRIC ACID MONOHYDRATE 15 ML: 500; 334 SOLUTION ORAL at 11:39

## 2020-06-16 RX ADMIN — HYDROCODONE BITARTRATE AND ACETAMINOPHEN 1 TABLET: 5; 325 TABLET ORAL at 23:38

## 2020-06-16 RX ADMIN — FENTANYL CITRATE 100 MCG: 50 INJECTION, SOLUTION INTRAMUSCULAR; INTRAVENOUS at 12:50

## 2020-06-16 RX ADMIN — SODIUM CHLORIDE, POTASSIUM CHLORIDE, SODIUM LACTATE AND CALCIUM CHLORIDE: 600; 310; 30; 20 INJECTION, SOLUTION INTRAVENOUS at 19:26

## 2020-06-16 RX ADMIN — PHENYLEPHRINE HYDROCHLORIDE 100 MCG: 10 INJECTION INTRAVENOUS at 12:15

## 2020-06-16 RX ADMIN — KETOROLAC TROMETHAMINE 30 MG: 30 INJECTION, SOLUTION INTRAMUSCULAR at 12:55

## 2020-06-16 RX ADMIN — SODIUM CHLORIDE, POTASSIUM CHLORIDE, SODIUM LACTATE AND CALCIUM CHLORIDE: 600; 310; 30; 20 INJECTION, SOLUTION INTRAVENOUS at 10:50

## 2020-06-16 RX ADMIN — FENTANYL CITRATE 100 MCG: 50 INJECTION, SOLUTION INTRAMUSCULAR; INTRAVENOUS at 12:35

## 2020-06-16 RX ADMIN — KETOROLAC TROMETHAMINE 30 MG: 30 INJECTION, SOLUTION INTRAMUSCULAR at 12:58

## 2020-06-16 RX ADMIN — PHENYLEPHRINE HYDROCHLORIDE 100 MCG: 10 INJECTION INTRAVENOUS at 12:23

## 2020-06-16 RX ADMIN — PHENYLEPHRINE HYDROCHLORIDE 100 MCG: 10 INJECTION INTRAVENOUS at 12:28

## 2020-06-16 RX ADMIN — Medication 50 MILLI-UNITS/MIN: at 13:30

## 2020-06-16 RX ADMIN — PHENYLEPHRINE HYDROCHLORIDE 100 MCG: 10 INJECTION INTRAVENOUS at 12:20

## 2020-06-16 RX ADMIN — KETOROLAC TROMETHAMINE 30 MG: 30 INJECTION, SOLUTION INTRAMUSCULAR at 19:38

## 2020-06-16 RX ADMIN — DOCUSATE SODIUM 100 MG: 100 CAPSULE, LIQUID FILLED ORAL at 19:38

## 2020-06-16 RX ADMIN — METOCLOPRAMIDE 10 MG: 5 INJECTION, SOLUTION INTRAMUSCULAR; INTRAVENOUS at 11:26

## 2020-06-16 RX ADMIN — FAMOTIDINE 20 MG: 10 INJECTION, SOLUTION INTRAVENOUS at 11:37

## 2020-06-16 RX ADMIN — MORPHINE SULFATE 0.2 MG: 0.5 INJECTION, SOLUTION EPIDURAL; INTRATHECAL; INTRAVENOUS at 12:15

## 2020-06-16 RX ADMIN — ONDANSETRON HYDROCHLORIDE 4 MG: 4 INJECTION, SOLUTION INTRAMUSCULAR; INTRAVENOUS at 12:20

## 2020-06-16 RX ADMIN — SODIUM CHLORIDE, POTASSIUM CHLORIDE, SODIUM LACTATE AND CALCIUM CHLORIDE: 600; 310; 30; 20 INJECTION, SOLUTION INTRAVENOUS at 12:55

## 2020-06-16 SDOH — HEALTH STABILITY: MENTAL HEALTH: HOW OFTEN DO YOU HAVE A DRINK CONTAINING ALCOHOL?: NEVER

## 2020-06-16 ASSESSMENT — PULMONARY FUNCTION TESTS
PIF_VALUE: 0

## 2020-06-16 ASSESSMENT — PAIN SCALES - GENERAL
PAINLEVEL_OUTOF10: 5
PAINLEVEL_OUTOF10: 5
PAINLEVEL_OUTOF10: 9

## 2020-06-16 NOTE — FLOWSHEET NOTE
Patient transferred to postpartum via bed by Phillip Orozco RN and settled into room. Writer oriented to folder and postpartum care. Oriented to call light, care board, rooming in, hourly round, celebration meal, educational booklet, phone and ordering meals. RN's name and phone number posted for pt. Siderails up x2. Pt oriented to equipment. Report received from Phillip Orozco RN and June Guillermo RN high risk nurse for infant and care assumed at this time. Pt included in discussion and all questions answered. IV infusing without difficulty noted. scd's on legs bilaterally.

## 2020-06-16 NOTE — PLAN OF CARE
Problem: Anxiety:  Goal: Level of anxiety will decrease  Description: Level of anxiety will decrease  Outcome: Ongoing  Note: Appears calm and relaxed. Problem: Aspiration - Risk of:  Goal: Absence of aspiration  Description: Absence of aspiration  Outcome: Ongoing  Note: NPO since yesterday. Alert. Problem: Tissue Perfusion - Uteroplacental, Altered:  Goal: Absence of abnormal fetal heart rate pattern  Description: Absence of abnormal fetal heart rate pattern  Outcome: Ongoing  Note: Reactive strip. Problem: Venous Thromboembolism - Risk of:  Goal: Will show no signs or symptoms of venous thromboembolism  Description: Will show no signs or symptoms of venous thromboembolism  Outcome: Ongoing  Note: Denies Shortness of breath, has negative homans. Problem: Falls - Risk of:  Goal: Will remain free from falls  Description: Will remain free from falls  Outcome: Ongoing  Note: SR up times 2. Call light with in reach. Husb at bedside. Problem: Discharge Planning:  Goal: Discharged to appropriate level of care  Description: Discharged to appropriate level of care  Outcome: Ongoing  Note: Planning TRO to mom baby after delivery then home in 2-3 days. Care plan reviewed with patient and husb. Patient and husb verbalize understanding of the plan of care and contribute to goal setting.

## 2020-06-16 NOTE — H&P
[address] [id number]  History and Physical Update    Pt Name: Daphne Rodriguez  MRN: [id number]  YOB: 1985  Date of evaluation: 2020    [x] I have examined the patient and reviewed the H&P/Consult and there are no changes to the patient or plans. 36yo  at 38/3 wks presents for RLTCS due to [facility]. FHT category 1.      [] I have examined the patient and reviewed the H&P/Consult and have noted the following changes:            Argelia Orozco MD  Electronically signed 2020 at 1:05 PM

## 2020-06-17 LAB
ABO: NORMAL
ANTIBODY SCREEN: NORMAL
HEMOGLOBIN: 10.7 GM/DL (ref 12–16)
RH FACTOR: NORMAL

## 2020-06-17 PROCEDURE — 2709999900 HC NON-CHARGEABLE SUPPLY

## 2020-06-17 PROCEDURE — 86901 BLOOD TYPING SEROLOGIC RH(D): CPT

## 2020-06-17 PROCEDURE — 1220000000 HC SEMI PRIVATE OB R&B

## 2020-06-17 PROCEDURE — 36415 COLL VENOUS BLD VENIPUNCTURE: CPT

## 2020-06-17 PROCEDURE — 86900 BLOOD TYPING SEROLOGIC ABO: CPT

## 2020-06-17 PROCEDURE — 6360000002 HC RX W HCPCS: Performed by: OBSTETRICS & GYNECOLOGY

## 2020-06-17 PROCEDURE — 2580000003 HC RX 258: Performed by: OBSTETRICS & GYNECOLOGY

## 2020-06-17 PROCEDURE — 6370000000 HC RX 637 (ALT 250 FOR IP): Performed by: ANESTHESIOLOGY

## 2020-06-17 PROCEDURE — 6370000000 HC RX 637 (ALT 250 FOR IP): Performed by: OBSTETRICS & GYNECOLOGY

## 2020-06-17 PROCEDURE — 86850 RBC ANTIBODY SCREEN: CPT

## 2020-06-17 PROCEDURE — 85018 HEMOGLOBIN: CPT

## 2020-06-17 PROCEDURE — 6360000002 HC RX W HCPCS: Performed by: ANESTHESIOLOGY

## 2020-06-17 RX ORDER — IBUPROFEN 800 MG/1
800 TABLET ORAL EVERY 8 HOURS
Status: DISCONTINUED | OUTPATIENT
Start: 2020-06-17 | End: 2020-06-18 | Stop reason: HOSPADM

## 2020-06-17 RX ADMIN — DOCUSATE SODIUM 100 MG: 100 CAPSULE, LIQUID FILLED ORAL at 08:27

## 2020-06-17 RX ADMIN — SODIUM CHLORIDE, POTASSIUM CHLORIDE, SODIUM LACTATE AND CALCIUM CHLORIDE: 600; 310; 30; 20 INJECTION, SOLUTION INTRAVENOUS at 03:49

## 2020-06-17 RX ADMIN — IBUPROFEN 800 MG: 800 TABLET, FILM COATED ORAL at 16:42

## 2020-06-17 RX ADMIN — HYDROCODONE BITARTRATE AND ACETAMINOPHEN 1 TABLET: 5; 325 TABLET ORAL at 18:16

## 2020-06-17 RX ADMIN — DOCUSATE SODIUM 100 MG: 100 CAPSULE, LIQUID FILLED ORAL at 20:33

## 2020-06-17 RX ADMIN — HYDROCODONE BITARTRATE AND ACETAMINOPHEN 1 TABLET: 5; 325 TABLET ORAL at 08:27

## 2020-06-17 RX ADMIN — KETOROLAC TROMETHAMINE 30 MG: 30 INJECTION, SOLUTION INTRAMUSCULAR at 01:55

## 2020-06-17 RX ADMIN — ENOXAPARIN SODIUM 40 MG: 40 INJECTION SUBCUTANEOUS at 04:28

## 2020-06-17 RX ADMIN — HYDROCODONE BITARTRATE AND ACETAMINOPHEN 2 TABLET: 5; 325 TABLET ORAL at 13:16

## 2020-06-17 ASSESSMENT — PAIN SCALES - GENERAL
PAINLEVEL_OUTOF10: 5
PAINLEVEL_OUTOF10: 5
PAINLEVEL_OUTOF10: 3
PAINLEVEL_OUTOF10: 9
PAINLEVEL_OUTOF10: 5

## 2020-06-17 ASSESSMENT — PAIN DESCRIPTION - LOCATION: LOCATION: ABDOMEN;INCISION

## 2020-06-17 ASSESSMENT — PAIN DESCRIPTION - PAIN TYPE: TYPE: SURGICAL PAIN

## 2020-06-17 ASSESSMENT — PAIN DESCRIPTION - ORIENTATION: ORIENTATION: LOWER

## 2020-06-17 NOTE — PROGRESS NOTES
Ambulated to bathroom with 1x assist.  Voided large amount, small amount of rubra lochia, 1 small clot. Megan care provided and ambulated back to bed.

## 2020-06-17 NOTE — PLAN OF CARE
Problem: Pain:  Goal: Pain level will decrease  Description: Pain level will decrease  6/17/2020 0004 by Michael Yu RN  Outcome: Ongoing  Note: Pain controlled with po meds. Discussed ice for perineal pain and/or incisional pain or the use of warm blanket/heating pad for uterine cramps. Pt states her pain goal 3/10 has been met. Problem: Discharge Planning:  Goal: Discharged to appropriate level of care  Description: Discharged to appropriate level of care  6/17/2020 0004 by Michael Yu RN  Outcome: Ongoing  Note: Plans to be discharged home with family when appropriate       Problem: Fluid Volume - Imbalance:  Goal: Absence of postpartum hemorrhage signs and symptoms  Description: Absence of postpartum hemorrhage signs and symptoms  6/17/2020 0004 by Michael Yu RN  Outcome: Ongoing  Note: Vital signs and assessments WNL, small rubra lochia, no clots       Problem: Infection - Surgical Site:  Goal: Will show no infection signs and symptoms  Description: Will show no infection signs and symptoms  6/17/2020 0004 by Michael Yu RN  Outcome: Ongoing  Note: Vital signs and assessments WNL, dressing CDI     Problem: Mood - Altered:  Goal: Mood stable  Description: Mood stable  6/17/2020 0004 by Michael Yu RN  Outcome: Ongoing  Note: Bonding with baby, participating in infant care. Problem: Urinary Retention:  Goal: Urinary elimination within specified parameters  Description: Urinary elimination within specified parameters  6/17/2020 0004 by Michael Yu RN  Outcome: Ongoing  Note: Chapman draining clear harish urine     Problem: Venous Thromboembolism:  Goal: Will show no signs or symptoms of venous thromboembolism  Description: Will show no signs or symptoms of venous thromboembolism  6/17/2020 0004 by Michael Yu RN  Outcome: Ongoing  Note: No muscle tightness, pain, or redness noted  on lower extremities.  SCDs on, subQ lovenox will be given this shift      Problem: Nausea/Vomiting:  Goal: Absence

## 2020-06-18 VITALS
HEIGHT: 62 IN | TEMPERATURE: 97.7 F | SYSTOLIC BLOOD PRESSURE: 121 MMHG | WEIGHT: 193 LBS | OXYGEN SATURATION: 98 % | BODY MASS INDEX: 35.51 KG/M2 | HEART RATE: 76 BPM | DIASTOLIC BLOOD PRESSURE: 65 MMHG | RESPIRATION RATE: 16 BRPM

## 2020-06-18 LAB
BASOPHILS # BLD: 0.2 %
BASOPHILS ABSOLUTE: 0 THOU/MM3 (ref 0–0.1)
EOSINOPHIL # BLD: 1.9 %
EOSINOPHILS ABSOLUTE: 0.2 THOU/MM3 (ref 0–0.4)
ERYTHROCYTE [DISTWIDTH] IN BLOOD BY AUTOMATED COUNT: 15.3 % (ref 11.5–14.5)
ERYTHROCYTE [DISTWIDTH] IN BLOOD BY AUTOMATED COUNT: 50.1 FL (ref 35–45)
HCT VFR BLD CALC: 33.8 % (ref 37–47)
HEMOGLOBIN: 10.6 GM/DL (ref 12–16)
IMMATURE GRANS (ABS): 0.07 THOU/MM3 (ref 0–0.07)
IMMATURE GRANULOCYTES: 0.8 %
LYMPHOCYTES # BLD: 20.6 %
LYMPHOCYTES ABSOLUTE: 1.7 THOU/MM3 (ref 1–4.8)
MCH RBC QN AUTO: 27.9 PG (ref 26–33)
MCHC RBC AUTO-ENTMCNC: 31.4 GM/DL (ref 32.2–35.5)
MCV RBC AUTO: 88.9 FL (ref 81–99)
MONOCYTES # BLD: 8.5 %
MONOCYTES ABSOLUTE: 0.7 THOU/MM3 (ref 0.4–1.3)
NUCLEATED RED BLOOD CELLS: 0 /100 WBC
PLATELET # BLD: 143 THOU/MM3 (ref 130–400)
PMV BLD AUTO: 11.1 FL (ref 9.4–12.4)
RBC # BLD: 3.8 MILL/MM3 (ref 4.2–5.4)
SEG NEUTROPHILS: 68 %
SEGMENTED NEUTROPHILS ABSOLUTE COUNT: 5.7 THOU/MM3 (ref 1.8–7.7)
WBC # BLD: 8.4 THOU/MM3 (ref 4.8–10.8)

## 2020-06-18 PROCEDURE — 6360000002 HC RX W HCPCS: Performed by: OBSTETRICS & GYNECOLOGY

## 2020-06-18 PROCEDURE — 6370000000 HC RX 637 (ALT 250 FOR IP): Performed by: OBSTETRICS & GYNECOLOGY

## 2020-06-18 PROCEDURE — 85025 COMPLETE CBC W/AUTO DIFF WBC: CPT

## 2020-06-18 PROCEDURE — 36415 COLL VENOUS BLD VENIPUNCTURE: CPT

## 2020-06-18 RX ORDER — HYDROCODONE BITARTRATE AND ACETAMINOPHEN 5; 325 MG/1; MG/1
1 TABLET ORAL EVERY 4 HOURS PRN
Qty: 30 TABLET | Refills: 0 | Status: SHIPPED | OUTPATIENT
Start: 2020-06-18 | End: 2020-06-23

## 2020-06-18 RX ORDER — PSEUDOEPHEDRINE HCL 30 MG
100 TABLET ORAL 2 TIMES DAILY PRN
COMMUNITY
Start: 2020-06-18 | End: 2021-05-19

## 2020-06-18 RX ORDER — IBUPROFEN 200 MG
800 TABLET ORAL EVERY 8 HOURS PRN
COMMUNITY
Start: 2020-06-18 | End: 2021-05-19

## 2020-06-18 RX ORDER — SERTRALINE HYDROCHLORIDE 25 MG/1
25 TABLET, FILM COATED ORAL DAILY
Qty: 30 TABLET | Refills: 11 | Status: SHIPPED | OUTPATIENT
Start: 2020-06-18 | End: 2022-03-03

## 2020-06-18 RX ADMIN — ENOXAPARIN SODIUM 40 MG: 40 INJECTION SUBCUTANEOUS at 04:16

## 2020-06-18 RX ADMIN — HYDROCODONE BITARTRATE AND ACETAMINOPHEN 1 TABLET: 5; 325 TABLET ORAL at 01:26

## 2020-06-18 RX ADMIN — IBUPROFEN 800 MG: 800 TABLET, FILM COATED ORAL at 04:16

## 2020-06-18 RX ADMIN — VITAMIN A, VITAMIN C, VITAMIN D-3, VITAMIN E, VITAMIN B-1, VITAMIN B-2, NIACIN, VITAMIN B-6, CALCIUM, IRON, ZINC, COPPER 1 TABLET: 4000; 120; 400; 22; 1.84; 3; 20; 10; 1; 12; 200; 27; 25; 2 TABLET ORAL at 08:24

## 2020-06-18 RX ADMIN — HYDROCODONE BITARTRATE AND ACETAMINOPHEN 1 TABLET: 5; 325 TABLET ORAL at 07:22

## 2020-06-18 RX ADMIN — DOCUSATE SODIUM 100 MG: 100 CAPSULE, LIQUID FILLED ORAL at 08:24

## 2020-06-18 ASSESSMENT — PAIN SCALES - GENERAL
PAINLEVEL_OUTOF10: 5
PAINLEVEL_OUTOF10: 4
PAINLEVEL_OUTOF10: 6

## 2020-06-18 NOTE — PLAN OF CARE
Problem: Pain:  Goal: Pain level will decrease  Description: Pain level will decrease  6/18/2020 1001 by Shorty Mckeon RN  Outcome: Ongoing  Note: Pain controlled well with Motrin and tylenol, rest and repositioning, pain goal 3     Problem: Discharge Planning:  Goal: Discharged to appropriate level of care  Description: Discharged to appropriate level of care  6/18/2020 1001 by Shorty Mckeon RN  Outcome: Ongoing  Note: Discharge to home     Problem: Fluid Volume - Imbalance:  Goal: Absence of postpartum hemorrhage signs and symptoms  Description: Absence of postpartum hemorrhage signs and symptoms  6/18/2020 1001 by Shorty Mckeon RN  Outcome: Ongoing  Note: Vitals stable     Problem: Fluid Volume - Imbalance:  Goal: Absence of imbalanced fluid volume signs and symptoms  Description: Absence of imbalanced fluid volume signs and symptoms  Outcome: Ongoing  Note: Small amount of lochia, no clots reported     Problem: Infection - Surgical Site:  Goal: Will show no infection signs and symptoms  Description: Will show no infection signs and symptoms  6/18/2020 1001 by Shorty Mckeon RN  Outcome: Ongoing  Note: Vitals stable     Problem: Mood - Altered:  Goal: Mood stable  Description: Mood stable  6/18/2020 1001 by Shorty Mckeon RN  Outcome: Ongoing  Note: Stable mood expresses needs     Problem: Venous Thromboembolism:  Goal: Will show no signs or symptoms of venous thromboembolism  Description: Will show no signs or symptoms of venous thromboembolism  6/18/2020 1001 by Shorty Mckeon RN  Outcome: Ongoing  Note: North negative homans sign   Care plan reviewed with patient and SO. Patient and SO verbalize understanding of the plan of care and contribute to goal setting.

## 2020-06-18 NOTE — LACTATION NOTE
Pt states infant is now latching well. Pt states no question or concerns at this time. Encouraged Pt to call with any questions or to set up an outpatient appointment as needed. Will follow up PRN.

## 2020-06-18 NOTE — FLOWSHEET NOTE
Post birth warning signs education paper given and reviewed, teaching complete. [place] postpartum depression screening discussed with patient, instructed to contact her healthcare provider if her score is > 10. Patient voiced understanding. Mother's blood type is A+.   Mother did not receive Rhogam.

## 2020-06-18 NOTE — PLAN OF CARE
Problem: Pain:  Goal: Pain level will decrease  Description: Pain level will decrease  Outcome: Ongoing  Note: Pain controlled with po meds. Discussed ice for perineal pain and/or incisional pain or the use of warm blanket/heating pad for uterine cramps. Pt states her pain goal 3/10 has been met. Problem: Discharge Planning:  Goal: Discharged to appropriate level of care  Description: Discharged to appropriate level of care  6/18/2020 0145 by Ruiz Chandra RN  Outcome: Ongoing  Note: Plans to be discharged home with family when appropriate       Problem: Fluid Volume - Imbalance:  Goal: Absence of postpartum hemorrhage signs and symptoms  Description: Absence of postpartum hemorrhage signs and symptoms  6/18/2020 0145 by Ruiz Chandra RN  Outcome: Ongoing  Note: Vital signs and assessments WNL, scant rubra lochia, small few clots     Problem: Infection - Surgical Site:  Goal: Will show no infection signs and symptoms  Description: Will show no infection signs and symptoms  6/18/2020 0145 by Ruiz Chandra RN  Outcome: Ongoing  Note: Vital signs and assessments WNL. Problem: Mood - Altered:  Goal: Mood stable  Description: Mood stable  6/18/2020 0145 by Ruiz Chandra RN  Outcome: Ongoing  Note: Bonding with baby, participating in infant care. Problem: Venous Thromboembolism:  Goal: Will show no signs or symptoms of venous thromboembolism  Description: Will show no signs or symptoms of venous thromboembolism  6/18/2020 0145 by Ruiz Chandra RN  Outcome: Ongoing  Note: No muscle tightness, pain, or redness noted  on lower extremities. Receiving daily subQ lovenox      Care plan reviewed with patient and . Patient and  verbalize understanding of the plan of care and contribute to goal setting.

## 2020-06-18 NOTE — DISCHARGE SUMMARY
C/Section Discharge Summary    Gestational Age:38w5d    Antepartum complications: GHTN    Date of Delivery: 2020      Condition: Good    Type of Delivery:  (see operative report)    Labs: CBC   Lab Results   Component Value Date    WBC 8.4 2020    HGB 10.6 (L) 2020    HCT 33.8 (L) 2020     2020        Intrapartum complications: None    Postpartum complications: none    The patient is ambulating well. The patient is tolerating a normal diet. Discharge Medication:    Mervatlexy Bhardwaj   Home Medication Instructions NOC:[id number]    Printed on:20 4213   Medication Information                      acetaminophen (TYLENOL) 500 MG tablet  Take 500 mg by mouth every 4 hours as needed for Pain             cyclobenzaprine (FLEXERIL) 10 MG tablet  Take 1 tablet by mouth 3 times daily as needed for Muscle spasms             docusate sodium (COLACE, DULCOLAX) 100 MG CAPS  Take 100 mg by mouth 2 times daily as needed for Constipation             HYDROcodone-acetaminophen (NORCO) 5-325 MG per tablet  Take 1 tablet by mouth every 4 hours as needed for Pain for up to 5 days. Intended supply: 5 days.  Take lowest dose possible to manage pain             ibuprofen (ADVIL;MOTRIN) 200 MG tablet  Take 4 tablets by mouth every 8 hours as needed for Pain             Prenatal Vit-Fe Fumarate-FA (PRENATAL 1 PLUS 1) 65-1 MG TABS  Take by mouth daily              pseudoephedrine (SUDAFED) 30 MG tablet  Take 30 mg by mouth every 4 hours as needed for Congestion             sertraline (ZOLOFT) 25 MG tablet  Take 1 tablet by mouth daily                  Discharge Date:     Plan:   Follow up in 1 week(s) for incision check    Argelia Cueva

## 2020-06-23 NOTE — OP NOTE
Department of Obstetrics and Gynecology   Section Note    Pt Name: Олег Brown  MRN: [id number] [place] #: [de-identified]  YOB: 1985  Procedure Performed By: Breanna Baxter MD    Indications: GHTN, prior C/S    Pre-operative Diagnosis: 38 week pregnancy. Post-operative Diagnosis:  Same, Delivered, Living newbornFemale  Procedure:  repeat low transverse  section  Findings:  Normal tubes, ovaries and uterus. Baby Female    Estimated Blood Loss:  600ml         Specimens: None     Complications:  None         Condition: infant stable to general nursery and mother stable    Indications:     Олег Brown is a 28 y.o. female [id number] at 38w3d who presented for   section for [facility]. She understood the  risks and benefits and signed informed consent. Procedure: The patient was taken to the Operating Room where spinal anesthesia was adequate. She was placed in the dorsal supine position with a leftward tilt and prepped and draped. A Pfannenstiel incision was made with the scalpel taken down to the fascia. The fascia was incised in the midline. This incision extended laterally. The underlying rectus muscle dissected bluntly and with the Fox scissors. The peritoneum identified and entered sharply. This incision extended superiorly and inferior with good visualization of the bladder. The vesicouterine peritoneum was identified and entered sharply. This incision extended laterally and the bladder flap created digitally. The lower uterine segment was incised in the midline and extended laterally. The vertex was removed from the uterus with the aid of  fundal pressure and delivered atraumatically. The rest of the baby delivered. The cord was clamped and cut and the baby was stimulated. Cord blood was obtained, the placenta delivered manually and intact. The uterus was cleared of all clots and debris and repaired with an 0 vicryl in a running locked fashion.  A second

## 2020-07-07 ENCOUNTER — TELEPHONE (OUTPATIENT)
Dept: FAMILY MEDICINE CLINIC | Age: 35
End: 2020-07-07

## 2020-07-07 RX ORDER — CEPHALEXIN 500 MG/1
500 CAPSULE ORAL 4 TIMES DAILY
Qty: 40 CAPSULE | Refills: 0 | Status: SHIPPED | OUTPATIENT
Start: 2020-07-07 | End: 2020-07-17

## 2021-02-22 NOTE — ANESTHESIA PRE PROCEDURE
"    [place] EMERGENCY DEPARTMENT ([facility])  2/21/21  History   No chief complaint on file.    The history is provided by the patient and medical records.     Jennifer Cervantes is a 21 year old female with a past medical history of sickle cell anemia, cerebral infarction, hemiplegia and hemiparesis, subsegmental PE, asthma, cognitive developmental delay and ODD, et al., with emergency care plan in place for her sickle cell disease, who presents to the ED for evaluation of diffuse pain consistent w/ previous sickle cell pain crises.     Today, patient presents saying she has had ongoing pain basically since the last ED visit (3 days ago), she is continued to have pain.  She reports that her current pain is consistent with previous sickle cell pain crises.  She currently feels a diffuse \"all over\" type pain that is similar to previous flares.  She sometimes will have sickle cell pain flares where is just her legs, arms and back area, and other times will feel as though the pain is throughout all of her extremities, joints, and torso and feels it's more like that.  There are no new features, however, and no new type of symptoms that she thinks may represent a different type of diagnosis or different type of condition causing her symptoms today.  With this, she denies any traumas or falls.  No fevers or chills.  She has had no headaches, no vision or hearing changes or symptoms.  No other HEENT symptoms.  No neck pain or stiffness.  She is not having any particular chest discomfort (had chest discomfort with her last ED visit and that has not returned).  No breathing symptoms.  She reports that she has a history of asthma but has not had any recent issues with such.  No cough or sputum production.  No hemoptysis.  No particular abdominal discomfort.  No nausea or vomiting.  No change to bowel or bladder.  No vaginal bleeding or discharge.  No rashes or skin changes.  No focal extremity symptoms, no joint " swelling.  No abnormal warmth, rashes or skin changes.  No lightheadedness, dizziness, syncope or near syncope.  No other new symptoms or complaints at this time.  Please see ROS for further details.    Per chart review, patient most recently was evaluated in the ED on 2/18/2021 (3 days ago), for a sickle cell pain crisis and chest pain.  CXR was performed which was relatively unremarkable.  Echocardiogram was conducted which revealed no significant change.  Patient noted to have stable increased WBC of 13.5, Hgb 6.1, platelets 400.  Troponin and repeat troponin negative.  In total, patient received 3 doses of morphine and 1 unit PRBCs, and was eventually discharged.     This part of the document was transcribed by Jacob Shepherd Scribsandra.    Past Medical History  Past Medical History:   Diagnosis Date     Anemia      Anxiety      Bleeding disorder (H)      Blood clotting disorder (H)      Cerebral infarction (H) 2015     Cognitive developmental delay     low IQ. Please recognize when managing pain and planning with her     Depressive disorder      Hemiplegia and hemiparesis following cerebral infarction affecting right dominant side (H)     right hand contractures     Iron overload due to repeated red blood cell transfusions      Migraines      Multiple subsegmental pulmonary emboli without acute cor pulmonale (H) 02/01/2021     Oppositional defiant behavior      Uncomplicated asthma      Past Surgical History:   Procedure Laterality Date     AS INSERT TUNNELED CV 2 CATH W/O PORT/PUMP       C BREAST REDUCTION (INCLUDES LIPO) TIER 3 Bilateral 04/23/2019     CHOLECYSTECTOMY       IR CVC NON TUNNEL PLACEMENT  04/07/2020     REPAIR TENDON ELBOW Right 10/02/2019    Procedure: Right Elbow Flexor Lengthening, Flexor Pronator Slide Of Wrist and Finger, Thumb Adductor Lengthening;  Surgeon: Anai Franco MD;  Location: [facility] OR     TONSILLECTOMY Bilateral 10/02/2019    Procedure: Bilateral Tonsillectomy;   Encounters:   06/16/20 193 lb (87.5 kg)   05/15/19 155 lb (70.3 kg)   04/18/19 153 lb (69.4 kg)     Body mass index is 35.3 kg/m². CBC:   Lab Results   Component Value Date    WBC 8.7 06/15/2020    RBC 4.52 06/15/2020    RBC 4.63 11/13/2019    HGB 12.6 06/15/2020    HCT 39.7 06/15/2020    MCV 87.8 06/15/2020    RDW 13.8 11/13/2019     06/15/2020       CMP:   Lab Results   Component Value Date     02/01/2018    K 4.5 02/01/2018     02/01/2018    CO2 24 02/01/2018    BUN 7 05/07/2020    CREATININE 0.5 05/07/2020    LABGLOM >90 05/07/2020    GLUCOSE 95 02/01/2018    PROT 7.1 02/01/2018    CALCIUM 9.0 02/01/2018    BILITOT 0.3 02/01/2018    ALKPHOS 38 02/01/2018    AST 15 05/07/2020    ALT 14 05/07/2020       POC Tests: No results for input(s): POCGLU, POCNA, POCK, POCCL, POCBUN, POCHEMO, POCHCT in the last 72 hours. Coags: No results found for: PROTIME, INR, APTT    HCG (If Applicable):   Lab Results   Component Value Date    PREGTESTUR negative 11/10/2016    PREGSERUM NEGATIVE 07/11/2019        ABGs: No results found for: PHART, PO2ART, BCJ7HAS, HNM8PBM, BEART, J4TSLTUA     Type & Screen (If Applicable):  Lab Results   Component Value Date    LABABO A 11/13/2019    [address] POS 06/15/2020       Drug/Infectious Status (If Applicable):  Lab Results   Component Value Date    HEPCAB Negative 11/19/2015       COVID-19 Screening (If Applicable): No results found for: COVID19      Anesthesia Evaluation    Airway: Mallampati: II       Mouth opening: > = 3 FB Dental:          Pulmonary:       (-) COPD                           Cardiovascular:        (-) hypertension      Rhythm: regular                      Neuro/Psych:               GI/Hepatic/Renal:             Endo/Other:                     Abdominal:           Vascular:                                        Anesthesia Plan      spinal     ASA 2             Anesthetic plan and risks discussed with patient.       Plan discussed with Surgeon: Farhana Guy MD;  Location: [facility] OR     acetaminophen (TYLENOL) 325 MG tablet  albuterol (PROAIR HFA/PROVENTIL HFA/VENTOLIN HFA) 108 (90 Base) MCG/ACT inhaler  albuterol (PROVENTIL) (2.5 MG/3ML) 0.083% neb solution  aspirin (ASPIRIN) 81 MG EC tablet  budesonide-formoterol (SYMBICORT) 160-4.5 MCG/ACT Inhaler  celecoxib (CELEBREX) 100 MG capsule  diphenhydrAMINE (BENADRYL) 25 MG capsule  Hydroxyurea 1000 MG TABS  JADENU 360 MG tablet  medroxyPROGESTERone (DEPO-PROVERA) 150 MG/ML IM injection  naloxone (NARCAN) 4 MG/0.1ML nasal spray  ondansetron (ZOFRAN) 8 MG tablet  oxyCODONE IR (ROXICODONE) 15 MG tablet  rivaroxaban ANTICOAGULANT (XARELTO) 15 MG TABS tablet  [START ON 2/23/2021] rivaroxaban ANTICOAGULANT (XARELTO) 20 MG TABS tablet  sertraline (ZOLOFT) 100 MG tablet      Allergies   Allergen Reactions     Fish-Derived Products Hives     Seafood Hives     Contrast Dye      Diagnostic X-Ray Materials      Gadolinium      Family History  Family History   Problem Relation Age of Onset     Sickle Cell Trait Mother      Hypertension Mother      Asthma Mother      Sickle Cell Trait Father      Social History   Social History     Tobacco Use     Smoking status: Never Smoker     Smokeless tobacco: Never Used   Substance Use Topics     Alcohol use: Not Currently     Alcohol/week: 0.0 standard drinks     Drug use: Never      Past medical history, past surgical history, medications, allergies, family history, and social history were reviewed with the patient. No additional pertinent items.       Review of Systems   Constitutional: Negative for chills and fever.   HENT: Negative for hearing loss.    Eyes: Negative for visual disturbance.   Respiratory: Negative for cough, shortness of breath and wheezing.    Cardiovascular: Negative for chest pain.   Gastrointestinal: Negative.    Genitourinary: Negative.    Musculoskeletal: Negative for joint swelling, neck pain and neck stiffness.        Positive for  "diffuse pain \"all over\"   Skin: Negative for color change and rash.   Neurological: Negative for dizziness, seizures, syncope, weakness, light-headedness, numbness and headaches.   Psychiatric/Behavioral: Negative.    All other systems reviewed and are negative.      Physical Exam      Physical Exam  CONSTITUTIONAL: Well-developed and well-nourished. Awake and alert. Non-toxic appearance. No acute distress.   HENT:   - Head: Normocephalic and atraumatic.   - Ears: Hearing and external ear grossly normal.   - Nose: Nose normal. No rhinorrhea. No epistaxis.   - Mouth/Throat: MMM. Normal voice.   EYES: Conjunctivae and lids are normal. No scleral icterus.   NECK: Normal range of motion and phonation normal. Neck supple.  No tracheal deviation, no stridor. No edema or erythema noted.  CARDIOVASCULAR: Normal rate, regular rhythm and no appreciable abnormal heart sounds.  PULMONARY/CHEST: Normal work of breathing. No accessory muscle usage or stridor. No respiratory distress. Very slight, faint wheeze right base only, otherwise no abnormal breath sounds. Good air movement throughout.  ABDOMEN: Soft, non-distended. No tenderness. No rigidity, rebound or guarding.   MUSCULOSKELETAL: Extremities warm and seemingly well perfused. No edema or calf tenderness. No apparent joint swelling. No abnormal warmth. No change in ROM from baseline. No acute neck/back findings.   NEUROLOGIC: Awake, alert. Not disoriented. No tremor. No seizure activity. GCS 15  SKIN: Skin is warm and dry. No rash noted. No diaphoresis. No pallor.   PSYCHIATRIC: Normal mood and affect. Speech and behavior normal. Thought processes linear. Cognition and memory are normal.        Assessments & Plan (with Medical Decision Making)   IMPRESSION: Jennifer Cervantes is a 21 year old female with a past medical history of sickle cell anemia, cerebral infarction, hemiplegia and hemiparesis, subsegmental PE, asthma, cognitive developmental delay and ODD, et al., with " emergency care plan in place for her sickle cell disease, who presents to the ED for evaluation of diffuse pain consistent w/ previous sickle cell pain crises.     Clinically, patient peers nontoxic, NAD.  Vitals show a low-grade temp of 99 9.7F but otherwise unremarkable.  (Patient denies any fevers, chills, myalgias, or other infectious type symptoms).  Otherwise on examination she has the very tiny/slight wheeze on the right base that is just like the slightest squeak, but otherwise no abnormal breath sounds, no cardiopulmonary findings, no abnormal findings, no extremity findings for inflammatory or infectious arthritis.  No obvious neck or back findings, no findings for meningitis or encephalitis, no acute skin findings.    Ddx includes, but not limited to, recurrent sickle cell or diffuse pain in the setting of sickle cell disease and similar to previous pain crises.  Think that to be most likely etiology.  Less likely occult infectious process as has no other symptoms for such, considered other metabolic or inflammatory processes but I think that to be much lower likelihood especially given how consistent as she reports her symptoms are with previous pain flares.    PLAN: Laboratory studies, urine studies, will get chest imaging given the wheeze to ensure that there is no finding for acute chest syndrome but she is not have any chest pain or breathing symptoms so I think that to be of lower likelihood, not even having any symptoms for asthma flare so is likely a benign finding.  --- We will work on pain control and fluid administration as per her ED care plan  --- Disposition pending ED course  - Risks/benefits of pursuing imaging reviewed and accepted.     RESULTS:  - Labs: Hgb 6.1 (was 6.1 during last ED visit and went up to 7 after a unit 1U PRBC), WBC 11.1 (down from previous), lactate normal  --- K 3.0,  --- Otherwise no acute findings on CMP or significant changes from previous.  --- Pregnancy  "negative  --- Reticulocyte count 13.2%, 301 absolute retake  - Imaging: Written preliminary reports reviewed:  --- CXR: \"No evidence of acute cardiopulmonary disease.\"  --- Results/reports reviewed w/ patient who expresses understanding of findings and F/U recommendations.    INTERVENTIONS:   - IV fluid  - IV morphine (2 mg IV every hour, x3)  - P.o. KCl    RE-EVALUATION:  - The patient's symptoms were improved during ED course and pt requested discharge.   - Pt otherwise continues to do well here in the ED, no acute issues or apparent concerning changes in vitals or clinical appearance.    DISCUSSIONS:  - w/ Patient: I have reviewed the available findings, discussed how would like to talk to Heme, possibly transfuse, admit, etc., but patient declines. Says is feeling better and wants to go home, but does agree to call her usual providers in the morning.   - As patient not holdable, then reviewed next best plan, need for close follow up, strict return/safety instructions with the patient.   - Reviewed how welcome back at any time and encourage to return should she change her mind, and especially if she has any new or worsening symptoms or any concerns.   - Reviewed plan, strict return/safety instructions. She expressed understanding and agreement with this plan. All questions answered to the best of our ability at this time.     DISPOSITION/PLANNING:  - IMPRESSION: Diffuse pain (improved), sickle cell disease   --- Sickle cell anemia (hgb 6.1)  --- Hypokalemia (some replacement ordered here, will need to F/U for recheck)  - DISPOSITION: Discharge as per pt request  - RECOMMENDATIONS: Conservative symptom management, strict return instructions    This part of the document was transcribed by Jered Carr Medical Scribe.  ______________________________________________________________________    --  Reta Miramontes MD  [facility] EMERGENCY DEPARTMENT  2/21/2021     Reta Miramontes MD  02/22/21 2227    "

## 2022-03-03 PROBLEM — M54.32 SCIATICA OF LEFT SIDE: Status: ACTIVE | Noted: 2022-03-03

## 2022-03-03 PROBLEM — G56.01 CARPAL TUNNEL SYNDROME OF RIGHT WRIST: Status: ACTIVE | Noted: 2022-03-03

## 2022-03-03 PROBLEM — M65.4 DE QUERVAIN'S TENOSYNOVITIS, RIGHT: Status: ACTIVE | Noted: 2022-03-03

## 2022-03-03 PROBLEM — O02.1 MISSED ABORTION: Status: RESOLVED | Noted: 2019-04-18 | Resolved: 2022-03-03

## 2022-03-03 PROBLEM — G43.009 MIGRAINE WITHOUT AURA AND WITHOUT STATUS MIGRAINOSUS, NOT INTRACTABLE: Status: ACTIVE | Noted: 2022-03-03

## 2022-03-03 PROBLEM — G56.20 LESION OF ULNAR NERVE: Status: ACTIVE | Noted: 2022-03-03

## 2022-03-03 PROBLEM — M19.049 LOCALIZED, PRIMARY OSTEOARTHRITIS OF HAND: Status: ACTIVE | Noted: 2022-03-03

## 2022-03-03 PROBLEM — G56.21 CUBITAL TUNNEL SYNDROME ON RIGHT: Status: ACTIVE | Noted: 2022-03-03

## 2022-05-22 ENCOUNTER — HOSPITAL ENCOUNTER (OUTPATIENT)
Age: 37
Discharge: HOME | End: 2022-05-22
Payer: COMMERCIAL

## 2022-05-22 ENCOUNTER — HOSPITAL ENCOUNTER (OUTPATIENT)
Dept: GENERAL RADIOLOGY | Age: 37
Discharge: HOME | End: 2022-05-22
Payer: COMMERCIAL

## 2022-05-22 DIAGNOSIS — S90.121A CONTUSION OF FIFTH TOE OF RIGHT FOOT, INITIAL ENCOUNTER: ICD-10-CM

## 2022-05-22 PROCEDURE — 73630 X-RAY EXAM OF FOOT: CPT

## 2022-06-21 ENCOUNTER — HOSPITAL ENCOUNTER (OUTPATIENT)
Dept: GENERAL RADIOLOGY | Age: 37
Discharge: HOME | End: 2022-06-21
Payer: COMMERCIAL

## 2022-06-21 ENCOUNTER — HOSPITAL ENCOUNTER (OUTPATIENT)
Age: 37
Discharge: HOME | End: 2022-06-21
Payer: COMMERCIAL

## 2022-06-21 DIAGNOSIS — M54.50 CHRONIC LEFT-SIDED LOW BACK PAIN WITHOUT SCIATICA: ICD-10-CM

## 2022-06-21 DIAGNOSIS — G89.29 CHRONIC LEFT-SIDED LOW BACK PAIN WITHOUT SCIATICA: ICD-10-CM

## 2022-06-21 PROCEDURE — 72100 X-RAY EXAM L-S SPINE 2/3 VWS: CPT

## 2022-10-13 PROBLEM — L30.9 DERMATITIS: Status: ACTIVE | Noted: 2022-10-13

## 2022-10-13 PROBLEM — M79.644 THUMB PAIN, RIGHT: Status: ACTIVE | Noted: 2022-10-13

## 2022-10-13 PROBLEM — R11.0 NAUSEA: Status: ACTIVE | Noted: 2022-10-13

## 2022-10-13 PROBLEM — J30.9 ALLERGIC RHINITIS: Status: ACTIVE | Noted: 2022-10-13

## 2022-11-18 ENCOUNTER — TRANSCRIBE ORDERS (OUTPATIENT)
Dept: ADMINISTRATIVE | Age: 37
End: 2022-11-18

## 2022-11-18 DIAGNOSIS — N64.52 NIPPLE DISCHARGE: ICD-10-CM

## 2022-11-18 DIAGNOSIS — N64.4 BREAST PAIN, LEFT: Primary | ICD-10-CM

## 2022-11-30 ENCOUNTER — HOSPITAL ENCOUNTER (OUTPATIENT)
Dept: WOMENS IMAGING | Age: 37
Discharge: HOME | End: 2022-11-30
Payer: COMMERCIAL

## 2022-11-30 DIAGNOSIS — N64.4 BREAST PAIN: ICD-10-CM

## 2022-11-30 DIAGNOSIS — N64.52 NIPPLE DISCHARGE: ICD-10-CM

## 2022-11-30 DIAGNOSIS — N64.4 BREAST PAIN, LEFT: ICD-10-CM

## 2022-11-30 PROCEDURE — 76642 ULTRASOUND BREAST LIMITED: CPT

## 2022-11-30 PROCEDURE — 77066 DX MAMMO INCL CAD BI: CPT

## 2023-04-13 PROBLEM — G56.01 CARPAL TUNNEL SYNDROME OF RIGHT WRIST: Status: RESOLVED | Noted: 2022-03-03 | Resolved: 2023-04-13

## 2024-05-13 PROBLEM — R11.0 NAUSEA: Status: RESOLVED | Noted: 2022-10-13 | Resolved: 2024-05-13

## 2024-05-13 PROBLEM — L30.9 DERMATITIS: Status: RESOLVED | Noted: 2022-10-13 | Resolved: 2024-05-13

## 2024-05-13 PROBLEM — K22.70 BARRETT'S ESOPHAGUS DETERMINED BY BIOPSY: Status: ACTIVE | Noted: 2024-05-13

## 2024-05-13 PROBLEM — M79.7 FIBROMYALGIA: Status: ACTIVE | Noted: 2023-03-01

## 2025-05-23 ENCOUNTER — HOSPITAL ENCOUNTER (OUTPATIENT)
Age: 40
Setting detail: OBSERVATION
Discharge: HOME | End: 2025-05-24
Attending: STUDENT IN AN ORGANIZED HEALTH CARE EDUCATION/TRAINING PROGRAM
Payer: COMMERCIAL

## 2025-05-23 ENCOUNTER — APPOINTMENT (OUTPATIENT)
Dept: CT IMAGING | Age: 40
End: 2025-05-23
Payer: COMMERCIAL

## 2025-05-23 ENCOUNTER — APPOINTMENT (OUTPATIENT)
Dept: GENERAL RADIOLOGY | Age: 40
End: 2025-05-23
Payer: COMMERCIAL

## 2025-05-23 DIAGNOSIS — R07.9 CHEST PAIN, UNSPECIFIED TYPE: ICD-10-CM

## 2025-05-23 DIAGNOSIS — I44.7 NEW ONSET LEFT BUNDLE BRANCH BLOCK (LBBB): Primary | ICD-10-CM

## 2025-05-23 LAB
ANION GAP SERPL CALC-SCNC: 10 MEQ/L (ref 8–16)
BASOPHILS ABSOLUTE: 0.1 THOU/MM3 (ref 0–0.1)
BASOPHILS NFR BLD AUTO: 0.8 %
BUN SERPL-MCNC: 17 MG/DL (ref 8–23)
CALCIUM SERPL-MCNC: 9.2 MG/DL (ref 8.6–10)
CHLORIDE SERPL-SCNC: 109 MEQ/L (ref 98–111)
CO2 SERPL-SCNC: 21 MEQ/L (ref 22–29)
CREAT SERPL-MCNC: 0.8 MG/DL (ref 0.5–0.9)
D DIMER PPP IA.FEU-MCNC: 281 NG/ML FEU (ref 0–500)
DEPRECATED RDW RBC AUTO: 39.9 FL (ref 35–45)
EKG ATRIAL RATE: 105 BPM
EKG P AXIS: 11 DEGREES
EKG P-R INTERVAL: 128 MS
EKG Q-T INTERVAL: 372 MS
EKG QRS DURATION: 138 MS
EKG QTC CALCULATION (BAZETT): 491 MS
EKG R AXIS: 16 DEGREES
EKG T AXIS: 109 DEGREES
EKG VENTRICULAR RATE: 105 BPM
EOSINOPHIL NFR BLD AUTO: 1.1 %
EOSINOPHILS ABSOLUTE: 0.1 THOU/MM3 (ref 0–0.4)
ERYTHROCYTE [DISTWIDTH] IN BLOOD BY AUTOMATED COUNT: 13.5 % (ref 11.5–14.5)
GFR SERPL CREATININE-BSD FRML MDRD: > 90 ML/MIN/1.73M2
GLUCOSE SERPL-MCNC: 108 MG/DL (ref 74–109)
HCT VFR BLD AUTO: 38 % (ref 37–47)
HGB BLD-MCNC: 12.9 GM/DL (ref 12–16)
IMM GRANULOCYTES # BLD AUTO: 0.02 THOU/MM3 (ref 0–0.07)
IMM GRANULOCYTES NFR BLD AUTO: 0.3 %
LYMPHOCYTES ABSOLUTE: 1.9 THOU/MM3 (ref 1–4.8)
LYMPHOCYTES NFR BLD AUTO: 29.4 %
MCH RBC QN AUTO: 28 PG (ref 26–33)
MCHC RBC AUTO-ENTMCNC: 33.9 GM/DL (ref 32.2–35.5)
MCV RBC AUTO: 82.4 FL (ref 81–99)
MONOCYTES ABSOLUTE: 0.9 THOU/MM3 (ref 0.4–1.3)
MONOCYTES NFR BLD AUTO: 14.4 %
NEUTROPHILS ABSOLUTE: 3.5 THOU/MM3 (ref 1.8–7.7)
NEUTROPHILS NFR BLD AUTO: 54 %
NRBC BLD AUTO-RTO: 0 /100 WBC
NT-PROBNP SERPL IA-MCNC: 86 PG/ML (ref 0–124)
OSMOLALITY SERPL CALC.SUM OF ELEC: 281.5 MOSMOL/KG (ref 275–300)
PLATELET # BLD AUTO: 221 THOU/MM3 (ref 130–400)
PMV BLD AUTO: 10.6 FL (ref 9.4–12.4)
POTASSIUM SERPL-SCNC: 3.9 MEQ/L (ref 3.5–5.2)
RBC # BLD AUTO: 4.61 MILL/MM3 (ref 4.2–5.4)
SODIUM SERPL-SCNC: 140 MEQ/L (ref 135–145)
TROPONIN, HIGH SENSITIVITY: 6 NG/L (ref 0–12)
TROPONIN, HIGH SENSITIVITY: 7 NG/L (ref 0–12)
TSH SERPL DL<=0.05 MIU/L-ACNC: 0.88 UIU/ML (ref 0.27–4.2)
WBC # BLD AUTO: 6.4 THOU/MM3 (ref 4.8–10.8)

## 2025-05-23 PROCEDURE — 85379 FIBRIN DEGRADATION QUANT: CPT

## 2025-05-23 PROCEDURE — 96366 THER/PROPH/DIAG IV INF ADDON: CPT

## 2025-05-23 PROCEDURE — 6360000004 HC RX CONTRAST MEDICATION: Performed by: NURSE PRACTITIONER

## 2025-05-23 PROCEDURE — 36415 COLL VENOUS BLD VENIPUNCTURE: CPT

## 2025-05-23 PROCEDURE — 71045 X-RAY EXAM CHEST 1 VIEW: CPT

## 2025-05-23 PROCEDURE — 71275 CT ANGIOGRAPHY CHEST: CPT

## 2025-05-23 PROCEDURE — 99285 EMERGENCY DEPT VISIT HI MDM: CPT

## 2025-05-23 PROCEDURE — 84443 ASSAY THYROID STIM HORMONE: CPT

## 2025-05-23 PROCEDURE — 6370000000 HC RX 637 (ALT 250 FOR IP): Performed by: EMERGENCY MEDICINE

## 2025-05-23 PROCEDURE — 80048 BASIC METABOLIC PNL TOTAL CA: CPT

## 2025-05-23 PROCEDURE — 93010 ELECTROCARDIOGRAM REPORT: CPT | Performed by: INTERNAL MEDICINE

## 2025-05-23 PROCEDURE — 83880 ASSAY OF NATRIURETIC PEPTIDE: CPT

## 2025-05-23 PROCEDURE — 2500000003 HC RX 250 WO HCPCS: Performed by: EMERGENCY MEDICINE

## 2025-05-23 PROCEDURE — 96365 THER/PROPH/DIAG IV INF INIT: CPT

## 2025-05-23 PROCEDURE — 85025 COMPLETE CBC W/AUTO DIFF WBC: CPT

## 2025-05-23 PROCEDURE — G0378 HOSPITAL OBSERVATION PER HR: HCPCS

## 2025-05-23 PROCEDURE — 96374 THER/PROPH/DIAG INJ IV PUSH: CPT

## 2025-05-23 PROCEDURE — 2500000003 HC RX 250 WO HCPCS: Performed by: NURSE PRACTITIONER

## 2025-05-23 PROCEDURE — 85520 HEPARIN ASSAY: CPT

## 2025-05-23 PROCEDURE — 84484 ASSAY OF TROPONIN QUANT: CPT

## 2025-05-23 PROCEDURE — 99223 1ST HOSP IP/OBS HIGH 75: CPT | Performed by: NURSE PRACTITIONER

## 2025-05-23 PROCEDURE — 93005 ELECTROCARDIOGRAM TRACING: CPT | Performed by: EMERGENCY MEDICINE

## 2025-05-23 PROCEDURE — 6360000002 HC RX W HCPCS: Performed by: NURSE PRACTITIONER

## 2025-05-23 PROCEDURE — 93005 ELECTROCARDIOGRAM TRACING: CPT | Performed by: NURSE PRACTITIONER

## 2025-05-23 PROCEDURE — 96375 TX/PRO/DX INJ NEW DRUG ADDON: CPT

## 2025-05-23 RX ORDER — SODIUM CHLORIDE 0.9 % (FLUSH) 0.9 %
5-40 SYRINGE (ML) INJECTION PRN
Status: DISCONTINUED | OUTPATIENT
Start: 2025-05-23 | End: 2025-05-24 | Stop reason: HOSPADM

## 2025-05-23 RX ORDER — ACETAMINOPHEN 650 MG/1
650 SUPPOSITORY RECTAL EVERY 6 HOURS PRN
Status: DISCONTINUED | OUTPATIENT
Start: 2025-05-23 | End: 2025-05-24 | Stop reason: HOSPADM

## 2025-05-23 RX ORDER — FAMOTIDINE 20 MG/1
20 TABLET, FILM COATED ORAL NIGHTLY
Status: DISCONTINUED | OUTPATIENT
Start: 2025-05-23 | End: 2025-05-24 | Stop reason: HOSPADM

## 2025-05-23 RX ORDER — HEPARIN SODIUM 1000 [USP'U]/ML
4000 INJECTION, SOLUTION INTRAVENOUS; SUBCUTANEOUS PRN
Status: DISCONTINUED | OUTPATIENT
Start: 2025-05-24 | End: 2025-05-24 | Stop reason: HOSPADM

## 2025-05-23 RX ORDER — NITROGLYCERIN 0.4 MG/1
0.4 TABLET SUBLINGUAL EVERY 5 MIN PRN
Status: DISCONTINUED | OUTPATIENT
Start: 2025-05-23 | End: 2025-05-24 | Stop reason: HOSPADM

## 2025-05-23 RX ORDER — IOPAMIDOL 755 MG/ML
80 INJECTION, SOLUTION INTRAVASCULAR
Status: COMPLETED | OUTPATIENT
Start: 2025-05-23 | End: 2025-05-23

## 2025-05-23 RX ORDER — ASPIRIN 81 MG/1
81 TABLET, CHEWABLE ORAL DAILY
Status: DISCONTINUED | OUTPATIENT
Start: 2025-05-24 | End: 2025-05-24 | Stop reason: HOSPADM

## 2025-05-23 RX ORDER — CYCLOBENZAPRINE HCL 10 MG
10 TABLET ORAL 3 TIMES DAILY PRN
Status: DISCONTINUED | OUTPATIENT
Start: 2025-05-23 | End: 2025-05-24 | Stop reason: HOSPADM

## 2025-05-23 RX ORDER — POTASSIUM CHLORIDE 7.45 MG/ML
10 INJECTION INTRAVENOUS PRN
Status: DISCONTINUED | OUTPATIENT
Start: 2025-05-23 | End: 2025-05-24 | Stop reason: HOSPADM

## 2025-05-23 RX ORDER — ENOXAPARIN SODIUM 100 MG/ML
40 INJECTION SUBCUTANEOUS DAILY
Status: DISCONTINUED | OUTPATIENT
Start: 2025-05-24 | End: 2025-05-23

## 2025-05-23 RX ORDER — HEPARIN SODIUM 1000 [USP'U]/ML
2000 INJECTION, SOLUTION INTRAVENOUS; SUBCUTANEOUS PRN
Status: DISCONTINUED | OUTPATIENT
Start: 2025-05-24 | End: 2025-05-24 | Stop reason: HOSPADM

## 2025-05-23 RX ORDER — SODIUM CHLORIDE 0.9 % (FLUSH) 0.9 %
5-40 SYRINGE (ML) INJECTION EVERY 12 HOURS SCHEDULED
Status: DISCONTINUED | OUTPATIENT
Start: 2025-05-23 | End: 2025-05-24 | Stop reason: HOSPADM

## 2025-05-23 RX ORDER — TOPIRAMATE 25 MG/1
50 TABLET, FILM COATED ORAL DAILY
Status: DISCONTINUED | OUTPATIENT
Start: 2025-05-24 | End: 2025-05-24 | Stop reason: HOSPADM

## 2025-05-23 RX ORDER — METOPROLOL TARTRATE 1 MG/ML
2.5 INJECTION, SOLUTION INTRAVENOUS ONCE
Status: COMPLETED | OUTPATIENT
Start: 2025-05-23 | End: 2025-05-23

## 2025-05-23 RX ORDER — PANTOPRAZOLE SODIUM 40 MG/1
40 TABLET, DELAYED RELEASE ORAL DAILY
Status: DISCONTINUED | OUTPATIENT
Start: 2025-05-24 | End: 2025-05-24 | Stop reason: HOSPADM

## 2025-05-23 RX ORDER — ASPIRIN 81 MG/1
324 TABLET, CHEWABLE ORAL ONCE
Status: COMPLETED | OUTPATIENT
Start: 2025-05-23 | End: 2025-05-23

## 2025-05-23 RX ORDER — ONDANSETRON 4 MG/1
4 TABLET, ORALLY DISINTEGRATING ORAL EVERY 8 HOURS PRN
Status: DISCONTINUED | OUTPATIENT
Start: 2025-05-23 | End: 2025-05-24 | Stop reason: HOSPADM

## 2025-05-23 RX ORDER — POTASSIUM CHLORIDE 1500 MG/1
40 TABLET, EXTENDED RELEASE ORAL PRN
Status: DISCONTINUED | OUTPATIENT
Start: 2025-05-23 | End: 2025-05-24 | Stop reason: HOSPADM

## 2025-05-23 RX ORDER — HEPARIN SODIUM 10000 [USP'U]/100ML
5-30 INJECTION, SOLUTION INTRAVENOUS CONTINUOUS
Status: DISCONTINUED | OUTPATIENT
Start: 2025-05-23 | End: 2025-05-24

## 2025-05-23 RX ORDER — POLYETHYLENE GLYCOL 3350 17 G/17G
17 POWDER, FOR SOLUTION ORAL DAILY PRN
Status: DISCONTINUED | OUTPATIENT
Start: 2025-05-23 | End: 2025-05-24 | Stop reason: HOSPADM

## 2025-05-23 RX ORDER — SODIUM CHLORIDE 9 MG/ML
INJECTION, SOLUTION INTRAVENOUS PRN
Status: DISCONTINUED | OUTPATIENT
Start: 2025-05-23 | End: 2025-05-24 | Stop reason: HOSPADM

## 2025-05-23 RX ORDER — ONDANSETRON 2 MG/ML
4 INJECTION INTRAMUSCULAR; INTRAVENOUS EVERY 6 HOURS PRN
Status: DISCONTINUED | OUTPATIENT
Start: 2025-05-23 | End: 2025-05-24 | Stop reason: HOSPADM

## 2025-05-23 RX ORDER — MAGNESIUM SULFATE IN WATER 40 MG/ML
2000 INJECTION, SOLUTION INTRAVENOUS PRN
Status: DISCONTINUED | OUTPATIENT
Start: 2025-05-23 | End: 2025-05-24 | Stop reason: HOSPADM

## 2025-05-23 RX ORDER — ACETAMINOPHEN 325 MG/1
650 TABLET ORAL EVERY 6 HOURS PRN
Status: DISCONTINUED | OUTPATIENT
Start: 2025-05-23 | End: 2025-05-24 | Stop reason: HOSPADM

## 2025-05-23 RX ORDER — HEPARIN SODIUM 1000 [USP'U]/ML
4000 INJECTION, SOLUTION INTRAVENOUS; SUBCUTANEOUS ONCE
Status: COMPLETED | OUTPATIENT
Start: 2025-05-23 | End: 2025-05-23

## 2025-05-23 RX ADMIN — SODIUM CHLORIDE, PRESERVATIVE FREE 10 ML: 5 INJECTION INTRAVENOUS at 21:23

## 2025-05-23 RX ADMIN — IOPAMIDOL 80 ML: 755 INJECTION, SOLUTION INTRAVENOUS at 20:20

## 2025-05-23 RX ADMIN — NITROGLYCERIN 0.4 MG: 0.4 TABLET, ORALLY DISINTEGRATING SUBLINGUAL at 17:23

## 2025-05-23 RX ADMIN — HEPARIN SODIUM 4000 UNITS: 1000 INJECTION INTRAVENOUS; SUBCUTANEOUS at 23:58

## 2025-05-23 RX ADMIN — ASPIRIN 324 MG: 81 TABLET, CHEWABLE ORAL at 17:22

## 2025-05-23 RX ADMIN — METOPROLOL TARTRATE 2.5 MG: 5 INJECTION INTRAVENOUS at 18:32

## 2025-05-23 RX ADMIN — HEPARIN SODIUM 12 UNITS/KG/HR: 10000 INJECTION, SOLUTION INTRAVENOUS at 23:58

## 2025-05-23 ASSESSMENT — PAIN SCALES - GENERAL
PAINLEVEL_OUTOF10: 2
PAINLEVEL_OUTOF10: 3
PAINLEVEL_OUTOF10: 1
PAINLEVEL_OUTOF10: 3

## 2025-05-23 ASSESSMENT — PAIN DESCRIPTION - LOCATION: LOCATION: CHEST

## 2025-05-23 ASSESSMENT — HEART SCORE: ECG: SIGNIFICANT ST-DEVIATION

## 2025-05-23 NOTE — ED NOTES
Pt to rm 23 per intake states she was told that during her EGD today that she had an abnormal heart rhythm- pt states since that time she's had some mid chest pain, states it could be related to anxiety since being told this. Pt appears in no acute distress, states she was able to eat some ice cream and a shredded chicken sandwich since her procedure- rates pain 3/10 at this time. Pt has copies of the procedure EKG strips as well as an outside EKG that reads a flutter but does not appear to be flutter. Pt currently ST on monitor- VS otherwise stable.

## 2025-05-23 NOTE — H&P
Hospitalist  History and Physical    Patient:  Gaby Queen  MRN: [id number]    CHIEF COMPLAINT: Left sided chest pain    History Obtained From:  patient, electronic medical record  PCP: Leeann Durham APRN - CNP    HISTORY OF PRESENT ILLNESS:   Gaby Queen is a 40-year-old  female presented to [facility] 2025 post esophagogastroduodenoscopy with dilation with chief complaint of left sided chest pain and concerns of intermittent left bundle branch block.    Patient identifies complaint of left sided chest pain \"alternating between sharp and dull \".  With radiation up to left upper arm/shoulder that is associated with shortness of breath and complaint of nausea patient denied diaphoresis.  Patient denies any recent fall or trauma.  Patient did receive 1 sublingual nitroglycerin which did decrease intensity of pain.  It is currently rating pain #1-3/10. She tells me that she had a similar incident of chest pain last week that resolved on its own.  EKG revealed left bundle branch block with tachycardia.  ER provider reviewed with on-call interventional cardiologist Dr. Fajardo.  No need for emergent cardiac cath.  CTA chest pending.  While in the emergency room patient did receive sublingual nitro, aspirin, and IVP Lopressor.    Past Medical History:        Diagnosis Date    Clark's esophagus 2024    follows with Shahana Casillas    Carpal tunnel syndrome of right wrist 2022    Chronic back pain     Fibromyalgia 2023    Dr. Abrams    History of migraine headaches     Infertility, female     Prolonged emergence from general anesthesia     Sinusitis    Lifetime non-smoker    Past Surgical History:        Procedure Laterality Date     SECTION N/A 2020     SECTION performed by Argelia Orozco MD at [facility] L&D OR     SECTION, LOW TRANSVERSE      DILATION AND CURETTAGE OF UTERUS N/A 2019    SUCTION D & C performed by Argelia Orozco MD at [facility] OR    HAND TENDON  S1, S2 normal, no murmur, click, rub or gallop  Abdomen: soft, non-tender; bowel sounds normal; no masses,  no organomegaly  Extremities: extremities normal, atraumatic, no cyanosis or edema  Neurologic: Mental status: Alert, oriented, thought content appropriate    CBC:   Recent Labs     25  1640   WBC 6.4   HGB 12.9        BMP:    Recent Labs     25  1640      K 3.9      CO2 21*   BUN 17   CREATININE 0.8   GLUCOSE 108     Hepatic: No results for input(s): \"AST\", \"ALT\", \"BILITOT\", \"ALKPHOS\" in the last 72 hours.    Invalid input(s): \"ALB\"  Troponin: No results for input(s): \"TROPONINI\" in the last 72 hours.  BNP: No results for input(s): \"BNP\" in the last 72 hours.  Lipids: No results for input(s): \"CHOL\", \"HDL\" in the last 72 hours.    Invalid input(s): \"LDLCALCU\"  ABGs: No results found for: \"PHART\", \"PO2ART\", \"XEF7COM\"  INR: No results for input(s): \"INR\" in the last 72 hours.  -----------------------------------------------------------------  PA/lat CXR: 2025 low lung volumes with associated crowding of the bronchovesicular markings.  No consolidation.  No pleural effusion or pneumothorax.  EK2025 sinus tachycardia with left bundle branch block heart rate 105  QTc 491    Assessment and Plan   Atypical chest pain, left sided chest pain with radiation down her left arm that is associated with shortness of breath and nausea.  Patient is status post esophagogastroduodenoscopy with dilation earlier in the day. EKG left bundle branch block.  Flat delta troponin 6-7.  Patient did receive relief after SL NTG given. CTA Chest is pending. Heparin gtt will be started. Cardiology to evaluate.   Clark's esophagitis, history patient underwent esophagogastroduodenoscopy with dilation 2025 per patient \"improvement noted \".  GERD, history  Impaired glucose tolerance, history patient states had been on metformin up until a year ago.  Random glucose was 108 at time of

## 2025-05-23 NOTE — ED NOTES
ED to inpatient nurses report      Chief Complaint:  Chief Complaint   Patient presents with    Palpitations     Present to ED from: Home    MOA:     LOC: alert and orientated to name, place, date  Mobility: Independent  Oxygen Baseline: RA    Current needs required: RA     Code Status:   Prior    What abnormal results were found and what did you give/do to treat them? Medicated per MAR. Pt restful on cart w/SO at bedside.   Any procedures or intervention occur?     Mental Status:  Level of Consciousness: Alert (0)    Psych Assessment:        Vitals:  Patient Vitals for the past 24 hrs:   BP Temp Temp src Pulse Resp SpO2 Weight   25 1832 131/65 -- -- 99 16 98 % --   25 1810 (!) 122/58 -- -- 87 -- -- --   25 1744 127/65 -- -- 94 16 98 % --   25 1718 133/80 -- -- 99 18 98 % --   25 1630 133/76 98.8 °F (37.1 °C) Oral (!) 103 18 98 % 72.6 kg (160 lb)        LDAs:   Peripheral IV 25 Distal;Right Wrist (Active)   Site Assessment Clean, dry & intact 25   Line Status Brisk blood return;Flushed 25       Ambulatory Status:  No data recorded    Diagnosis:  DISPOSITION Decision To Admit 2025 06:35:53 PM   Final diagnoses:   New onset left bundle branch block (LBBB)   Chest pain, unspecified type        Consults:  None     Pain Score:  Pain Assessment  Pain Assessment: 0-10  Pain Level: 1  Pain Location: Chest    C-SSRS:   Risk of Suicide: No Risk    Sepsis Screening:       [place] Fall Risk:       Swallow Screening        Preferred Language:   English      ALLERGIES     Latex and Percocet [oxycodone-acetaminophen]    SURGICAL HISTORY       Past Surgical History:   Procedure Laterality Date     SECTION N/A 2020     SECTION performed by Argelia Orozco MD at [facility] L&D OR     SECTION, LOW TRANSVERSE      DILATION AND CURETTAGE OF UTERUS N/A 2019    SUCTION D & C performed by Argelia Orozco MD at [facility] OR    HAND TENDON SURGERY Right

## 2025-05-23 NOTE — ED PROVIDER NOTES
[facility] EMERGENCY DEPARTMENT    EMERGENCY MEDICINE      Pt Name: Gaby Queen  MRN: [id number]  Birthdate 1985  Date of evaluation: 2025  Treating Physician: Dr. Carter  Resident Physician: Debra Richardson MD    CHIEF COMPLAINT       Chief Complaint   Patient presents with    Palpitations     History obtained from chart review and the patient.    HISTORY OF PRESENT ILLNESS    HPI    Gaby Queen is a 40 y.o. female with PMH of fibromyalgia, chronic back pain presents to the emergency department for evaluation of chest palpitations.  Patient was getting an endoscopy performed today and had a rhythm change during the endoscopy but returned back to normal sinus rhythm so procedure was performed.  Patient then stated that after the procedure she was having chest palpitations and had another EKG performed which showed an abnormal rhythm so she was sent to the ED.  Patient is complaining of left upper chest pain that feels like a tightness and is nonradiating but worsens with blood pressure cuff inflation.  Patient does also endorse a headache which stated that she gets them when she does not eat. Patient is denying any shortness of breath, nausea, vomiting, trauma, fever, swelling, numbness, weakness.    The patient has no other acute complaints at this time.    PAST MEDICAL AND SURGICAL HISTORY     Past Medical History:   Diagnosis Date    Clark's esophagus 2024    follows with Shahana Casillas    Carpal tunnel syndrome of right wrist 2022    Chronic back pain     Fibromyalgia 2023    Dr. Abrams    History of migraine headaches 1997    Infertility, female     Prolonged emergence from general anesthesia     Sinusitis        Past Surgical History:   Procedure Laterality Date     SECTION N/A 2020     SECTION performed by Argelia Orozco MD at [facility] L&D OR     SECTION, LOW TRANSVERSE      DILATION AND CURETTAGE OF UTERUS N/A 2019    SUCTION D & C  in proofreading. Please refer to my supervising physician's documentation if my documentation differs.    Electronically Signed: Debra Richardson MD, 05/23/25, 6:36 PM

## 2025-05-24 ENCOUNTER — APPOINTMENT (OUTPATIENT)
Age: 40
End: 2025-05-24
Attending: INTERNAL MEDICINE
Payer: COMMERCIAL

## 2025-05-24 VITALS
RESPIRATION RATE: 16 BRPM | TEMPERATURE: 98.2 F | SYSTOLIC BLOOD PRESSURE: 134 MMHG | OXYGEN SATURATION: 97 % | DIASTOLIC BLOOD PRESSURE: 74 MMHG | WEIGHT: 161 LBS | HEART RATE: 95 BPM | HEIGHT: 61 IN | BODY MASS INDEX: 30.4 KG/M2

## 2025-05-24 PROBLEM — I44.7 NEW ONSET LEFT BUNDLE BRANCH BLOCK (LBBB): Status: ACTIVE | Noted: 2025-05-24

## 2025-05-24 LAB
ANION GAP SERPL CALC-SCNC: 11 MEQ/L (ref 8–16)
BUN SERPL-MCNC: 15 MG/DL (ref 8–23)
CALCIUM SERPL-MCNC: 8.6 MG/DL (ref 8.6–10)
CHLORIDE SERPL-SCNC: 108 MEQ/L (ref 98–111)
CHOLEST SERPL-MCNC: 142 MG/DL (ref 100–199)
CO2 SERPL-SCNC: 20 MEQ/L (ref 22–29)
CREAT SERPL-MCNC: 0.7 MG/DL (ref 0.5–0.9)
CRP SERPL-MCNC: 0.99 MG/DL (ref 0–0.5)
DEPRECATED MEAN GLUCOSE BLD GHB EST-ACNC: 102 MG/DL (ref 70–126)
ECHO AO ASC DIAM: 2.5 CM
ECHO AO ASCENDING AORTA INDEX: 1.45 CM/M2
ECHO AV CUSP MM: 1.7 CM
ECHO AV PEAK GRADIENT: 12 MMHG
ECHO AV PEAK VELOCITY: 1.7 M/S
ECHO AV VELOCITY RATIO: 0.53
ECHO BSA: 1.77 M2
ECHO EST RA PRESSURE: 3 MMHG
ECHO LA AREA 2C: 8.1 CM2
ECHO LA AREA 4C: 8.9 CM2
ECHO LA DIAMETER INDEX: 1.92 CM/M2
ECHO LA DIAMETER: 3.3 CM
ECHO LA MAJOR AXIS: 3.3 CM
ECHO LA MINOR AXIS: 3.4 CM
ECHO LA VOL BP: 19 ML (ref 22–52)
ECHO LA VOL MOD A2C: 17 ML (ref 22–52)
ECHO LA VOL MOD A4C: 20 ML (ref 22–52)
ECHO LA VOL/BSA BIPLANE: 11 ML/M2 (ref 16–34)
ECHO LA VOLUME INDEX MOD A2C: 10 ML/M2 (ref 16–34)
ECHO LA VOLUME INDEX MOD A4C: 12 ML/M2 (ref 16–34)
ECHO LV E' LATERAL VELOCITY: 8.9 CM/S
ECHO LV E' SEPTAL VELOCITY: 7.6 CM/S
ECHO LV EF PHYSICIAN: 60 %
ECHO LV FRACTIONAL SHORTENING: 29 % (ref 28–44)
ECHO LV INTERNAL DIMENSION DIASTOLE INDEX: 2.38 CM/M2
ECHO LV INTERNAL DIMENSION DIASTOLIC: 4.1 CM (ref 3.9–5.3)
ECHO LV INTERNAL DIMENSION SYSTOLIC INDEX: 1.69 CM/M2
ECHO LV INTERNAL DIMENSION SYSTOLIC: 2.9 CM
ECHO LV ISOVOLUMETRIC RELAXATION TIME (IVRT): 85 MS
ECHO LV IVSD: 1.1 CM (ref 0.6–0.9)
ECHO LV MASS 2D: 132.1 G (ref 67–162)
ECHO LV MASS INDEX 2D: 76.8 G/M2 (ref 43–95)
ECHO LV POSTERIOR WALL DIASTOLIC: 0.9 CM (ref 0.6–0.9)
ECHO LV RELATIVE WALL THICKNESS RATIO: 0.44
ECHO LVOT PEAK GRADIENT: 3 MMHG
ECHO LVOT PEAK VELOCITY: 0.9 M/S
ECHO MV A VELOCITY: 0.57 M/S
ECHO MV E DECELERATION TIME (DT): 109 MS
ECHO MV E VELOCITY: 0.62 M/S
ECHO MV E/A RATIO: 1.09
ECHO MV E/E' LATERAL: 6.97
ECHO MV E/E' RATIO (AVERAGED): 7.56
ECHO MV E/E' SEPTAL: 8.16
ECHO MV REGURGITANT PEAK GRADIENT: 112 MMHG
ECHO MV REGURGITANT PEAK VELOCITY: 5.3 M/S
ECHO PV MAX VELOCITY: 0.8 M/S
ECHO PV PEAK GRADIENT: 2 MMHG
ECHO RIGHT VENTRICULAR SYSTOLIC PRESSURE (RVSP): 21 MMHG
ECHO RV INTERNAL DIMENSION: 2.4 CM
ECHO RV TAPSE: 1.8 CM (ref 1.7–?)
ECHO TV E WAVE: 0.4 M/S
ECHO TV REGURGITANT MAX VELOCITY: 2.14 M/S
ECHO TV REGURGITANT PEAK GRADIENT: 18 MMHG
EKG ATRIAL RATE: 89 BPM
EKG ATRIAL RATE: 94 BPM
EKG P AXIS: -2 DEGREES
EKG P AXIS: 1 DEGREES
EKG P-R INTERVAL: 128 MS
EKG P-R INTERVAL: 128 MS
EKG Q-T INTERVAL: 366 MS
EKG Q-T INTERVAL: 396 MS
EKG QRS DURATION: 108 MS
EKG QRS DURATION: 138 MS
EKG QTC CALCULATION (BAZETT): 445 MS
EKG QTC CALCULATION (BAZETT): 495 MS
EKG R AXIS: 23 DEGREES
EKG R AXIS: 3 DEGREES
EKG T AXIS: 102 DEGREES
EKG T AXIS: 32 DEGREES
EKG VENTRICULAR RATE: 89 BPM
EKG VENTRICULAR RATE: 94 BPM
GFR SERPL CREATININE-BSD FRML MDRD: > 90 ML/MIN/1.73M2
GLUCOSE SERPL-MCNC: 110 MG/DL (ref 74–109)
HBA1C MFR BLD HPLC: 5.4 % (ref 4–6)
HDLC SERPL-MCNC: 39 MG/DL
HEPARIN UNFRACTIONATED: 0.4 U/ML (ref 0.3–0.7)
HEPARIN UNFRACTIONATED: < 0.04 U/ML (ref 0.3–0.7)
LDLC SERPL CALC-MCNC: 62 MG/DL
POTASSIUM SERPL-SCNC: 4.2 MEQ/L (ref 3.5–5.2)
SODIUM SERPL-SCNC: 139 MEQ/L (ref 135–145)
TRIGL SERPL-MCNC: 207 MG/DL (ref 0–199)

## 2025-05-24 PROCEDURE — 93010 ELECTROCARDIOGRAM REPORT: CPT | Performed by: INTERNAL MEDICINE

## 2025-05-24 PROCEDURE — 80048 BASIC METABOLIC PNL TOTAL CA: CPT

## 2025-05-24 PROCEDURE — 96366 THER/PROPH/DIAG IV INF ADDON: CPT

## 2025-05-24 PROCEDURE — 86140 C-REACTIVE PROTEIN: CPT

## 2025-05-24 PROCEDURE — 93306 TTE W/DOPPLER COMPLETE: CPT

## 2025-05-24 PROCEDURE — 80061 LIPID PANEL: CPT

## 2025-05-24 PROCEDURE — 93005 ELECTROCARDIOGRAM TRACING: CPT | Performed by: PHYSICIAN ASSISTANT

## 2025-05-24 PROCEDURE — 6370000000 HC RX 637 (ALT 250 FOR IP): Performed by: NURSE PRACTITIONER

## 2025-05-24 PROCEDURE — 36415 COLL VENOUS BLD VENIPUNCTURE: CPT

## 2025-05-24 PROCEDURE — 93306 TTE W/DOPPLER COMPLETE: CPT | Performed by: INTERNAL MEDICINE

## 2025-05-24 PROCEDURE — 99223 1ST HOSP IP/OBS HIGH 75: CPT | Performed by: INTERNAL MEDICINE

## 2025-05-24 PROCEDURE — 85520 HEPARIN ASSAY: CPT

## 2025-05-24 PROCEDURE — G0378 HOSPITAL OBSERVATION PER HR: HCPCS

## 2025-05-24 PROCEDURE — 83036 HEMOGLOBIN GLYCOSYLATED A1C: CPT

## 2025-05-24 RX ORDER — LIDOCAINE 4 G/G
1 PATCH TOPICAL EVERY 24 HOURS
Status: DISCONTINUED | OUTPATIENT
Start: 2025-05-24 | End: 2025-05-24 | Stop reason: HOSPADM

## 2025-05-24 RX ADMIN — PANTOPRAZOLE SODIUM 40 MG: 40 TABLET, DELAYED RELEASE ORAL at 08:27

## 2025-05-24 RX ADMIN — ASPIRIN 81 MG: 81 TABLET, CHEWABLE ORAL at 08:27

## 2025-05-24 RX ADMIN — TOPIRAMATE 50 MG: 25 TABLET, FILM COATED ORAL at 08:27

## 2025-05-24 RX ADMIN — FAMOTIDINE 20 MG: 20 TABLET, FILM COATED ORAL at 00:01

## 2025-05-24 ASSESSMENT — PAIN DESCRIPTION - LOCATION: LOCATION: HEAD

## 2025-05-24 ASSESSMENT — PAIN SCALES - GENERAL
PAINLEVEL_OUTOF10: 4
PAINLEVEL_OUTOF10: 2

## 2025-05-24 NOTE — DISCHARGE SUMMARY
Hospital Medicine Discharge Summary      Patient Identification:   Gaby Queen   : 1985  MRN: [id number]   Account: [id number]      Patient's PCP: Leeann Durham APRN - CNP    Admit Date: 2025     Discharge Date: 25    Admitting Physician: No admitting provider for patient encounter.     Discharge Physician: Robin Hardwick PA-C     Gaby Queen is a 40 y.o. female admitted to [facility] on 2025.      HPI On Admission From H&P:    \"Gaby Queen is a 40-year-old  female presented to [facility] 2025 post esophagogastroduodenoscopy with dilation with chief complaint of left sided chest pain and concerns of intermittent left bundle branch block.     Patient identifies complaint of left sided chest pain \"alternating between sharp and dull \".  With radiation up to left upper arm/shoulder that is associated with shortness of breath and complaint of nausea patient denied diaphoresis.  Patient denies any recent fall or trauma.  Patient did receive 1 sublingual nitroglycerin which did decrease intensity of pain.  It is currently rating pain #1-3/10. She tells me that she had a similar incident of chest pain last week that resolved on its own.  EKG revealed left bundle branch block with tachycardia.  ER provider reviewed with on-call interventional cardiologist Dr. Fajardo.  No need for emergent cardiac cath.  CTA chest pending.  While in the emergency room patient did receive sublingual nitro, aspirin, and IVP Lopressor.\"    Assessment/Plan With Discharge Diagnoses:    \"Atypical chest pain, left sided chest pain with radiation down her left arm that is associated with shortness of breath and nausea.  Patient is status post esophagogastroduodenoscopy with dilation earlier in the day. EKG left bundle branch block.  Flat delta troponin 6-7.  Patient did receive relief after SL NTG given. CTA Chest is pending. Heparin gtt will be started. Cardiology to evaluate.

## 2025-05-24 NOTE — PSYCHOTHERAPY
Patient being discharged at this time per Dr. Brantley and hospitalist. Patient advised to follow up and return to hospital if symptoms return or worsen.  at bedside and has no further questions.

## 2025-05-24 NOTE — PROCEDURES
PROCEDURE NOTE  Date: 5/23/2025   Name: Gaby Queen  YOB: 1985    Procedures  12 lead EKG completed. Results handed to Morgan ZAPATA.

## 2025-05-24 NOTE — CONSULTS
[facility]  Cardiology Consult        Patient:  Gaby Queen  YOB: 1985  MRN: [id number]     Acct: [id number]    PCP: Leeann Durham APRN - CNP    Date of Admission: 2025      Reason for Consultation:  Chest pain, LBBB      History Of Present Illness:    40 y.o. pleasant female who presented to the hospital after an EGD for chest pain and LBBB.  Patient reports intermittent palpitations, and chest pains were primarily with deep breathing and sometimes with lying down.  She thought it was due to her anxiety.  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  Compared to  EKG, the LBBB is new.  Her symptoms are atypical.   Serial enzymes were negative for ACS.  She underwent CTA chest and dissection and PE is ruled out.  Cardiology was consulted for further management.     HEART score: 4    Data reviewed (unless otherwise discussed in assessment/plan)  EKG:  I have reviewed the EKG with the following interpretation:   Imaging:   Labs: Reviewed, see chart and plan below.      All labs, EKG's, diagnostic testing and images as well as cardiac cath, stress testing were reviewed during this encounter.    Past Medical History:          Diagnosis Date    Clark's esophagus 2024    follows with Shahana Casillas    Carpal tunnel syndrome of right wrist 2022    Chronic back pain     Fibromyalgia 2023    Dr. Abrams    History of migraine headaches 1997    Infertility, female     Prolonged emergence from general anesthesia     Sinusitis        Past Surgical History:          Procedure Laterality Date     SECTION N/A 2020     SECTION performed by Argelia Orozco MD at [facility] L&D OR     SECTION, LOW TRANSVERSE      DILATION AND CURETTAGE OF UTERUS N/A 2019    SUCTION D & C performed by Argelia Orozco MD at [facility] OR    HAND TENDON SURGERY Right 2021    right ulnar release, right carpal tunnel, and right tenosynovitis release    KNEE  potassium chloride (KLOR-CON M) extended release tablet 40 mEq  40 mEq Oral PRN Barbara Baeza APRN - CNP        Or    potassium bicarb-citric acid (EFFER-K) effervescent tablet 40 mEq  40 mEq Oral PRN Barbara Baeza APRN - CNP        Or    potassium chloride 10 mEq/100 mL IVPB (Peripheral Line)  10 mEq IntraVENous PRN Barbara Baeza APRN - CNP        magnesium sulfate 2000 mg in 50 mL IVPB premix  2,000 mg IntraVENous PRN Barbara Baeza APRN - CNP        ondansetron (ZOFRAN-ODT) disintegrating tablet 4 mg  4 mg Oral Q8H PRN Barbara Baeza APRN - CNP        Or    ondansetron (ZOFRAN) injection 4 mg  4 mg IntraVENous Q6H PRN Barbara Baeza APRN - CNP        polyethylene glycol (GLYCOLAX) packet 17 g  17 g Oral Daily PRN Barbara Baeza APRN - CNP        acetaminophen (TYLENOL) tablet 650 mg  650 mg Oral Q6H PRN Barbara Baeza APRN - CNP        Or    acetaminophen (TYLENOL) suppository 650 mg  650 mg Rectal Q6H PRN Barbara Baeza APRN - CNP        melatonin tablet 3 mg  3 mg Oral Nightly PRN Barbara Baeza APRN - CNP        cyclobenzaprine (FLEXERIL) tablet 10 mg  10 mg Oral TID PRN Barbara Baeza APRN - CNP        pantoprazole (PROTONIX) tablet 40 mg  40 mg Oral Daily Barbara Baeza APRN - CNP   40 mg at 05/24/25 0827    famotidine (PEPCID) tablet 20 mg  20 mg Oral Nightly Barbara Baeza APRN - CNP   20 mg at 05/24/25 0001    topiramate (TOPAMAX) tablet 50 mg  50 mg Oral Daily Barbara Baeza APRN - CNP   50 mg at 05/24/25 0827    heparin (porcine) injection 4,000 Units  4,000 Units IntraVENous PRN Barbara Baeza APRN - CNP        heparin (porcine) injection 2,000 Units  2,000 Units IntraVENous PRN Barbara Baeza APRN - CNP        heparin 25,000 units in dextrose 5%

## 2025-05-24 NOTE — PROCEDURES
PROCEDURE NOTE  Date: 5/24/2025   Name: Gaby Queen  YOB: 1985    Procedures  EKG completed         147.32

## 2025-05-24 NOTE — PROGRESS NOTES
Hospitalist Progress Note    Patient:  Gaby Queen      Unit/Bed:8A-17/017-A    YOB: 1985    MRN: [id number]       Acct: [id number]     PCP: Leeann Durham APRN - CNP    Date of Admission: 5/23/2025    Assessment/Plan:    Atypical chest pain, left sided chest pain with radiation down her left arm that is associated with shortness of breath and nausea.  Patient is status post esophagogastroduodenoscopy with dilation earlier in the day. EKG left bundle branch block.  Flat delta troponin 6-7.  Patient did receive relief after SL NTG given. CTA Chest is pending. Heparin gtt will be started. Cardiology to evaluate.   Clark's esophagitis, history patient underwent esophagogastroduodenoscopy with dilation 5/23/2025 per patient \"improvement noted \".  GERD, history  Impaired glucose tolerance, history patient states had been on metformin up until a year ago.  Random glucose was 108 at time of admission.  Hemoglobin A1c is pending.  Anxiety, history  Migraine, history     Disposition:    [x] Home       [] TCU       [] Rehab       [] Psych       [] SNF       [] Long Term Care Facility       [] Other-    Chief Complaint: Left Sided Chest Pain      Subjective: 40 y.o. female admitted to the hospitalist service for chest pain. Patient       Medications:    Infusion Medications    sodium chloride      heparin (PORCINE) Infusion 12 Units/kg/hr (05/23/25 8064)     Scheduled Medications    sodium chloride flush  5-40 mL IntraVENous 2 times per day    pantoprazole  40 mg Oral Daily    famotidine  20 mg Oral Nightly    topiramate  50 mg Oral Daily    aspirin  81 mg Oral Daily     PRN Meds: nitroGLYCERIN, sodium chloride flush, sodium chloride, potassium chloride **OR** potassium alternative oral replacement **OR** potassium chloride, magnesium sulfate, ondansetron **OR** ondansetron, polyethylene glycol, acetaminophen **OR** acetaminophen, melatonin, cyclobenzaprine, heparin (porcine), heparin

## 2025-05-28 ENCOUNTER — TELEPHONE (OUTPATIENT)
Dept: CARDIOLOGY CLINIC | Age: 40
End: 2025-05-28

## 2025-05-28 DIAGNOSIS — I44.7 NEW ONSET LEFT BUNDLE BRANCH BLOCK (LBBB): Primary | ICD-10-CM

## 2025-05-28 PROBLEM — K21.9 CHRONIC GERD: Status: ACTIVE | Noted: 2025-05-28

## 2025-05-28 NOTE — TELEPHONE ENCOUNTER
Dr. Maria Victoria Ayala faxed a letter to the office stating patient had an abnormal rhythm prior to anesthesia for EGD so she was sent to the ER for evaluation, he is recommending a holter monitor.     Patient has a hospital follow up for 6-27-25, would you like to order a monitor?

## 2025-06-09 ENCOUNTER — HOSPITAL ENCOUNTER (OUTPATIENT)
Age: 40
Discharge: HOME | End: 2025-06-11
Attending: INTERNAL MEDICINE
Payer: COMMERCIAL

## 2025-06-09 DIAGNOSIS — I44.7 NEW ONSET LEFT BUNDLE BRANCH BLOCK (LBBB): ICD-10-CM

## 2025-06-09 PROCEDURE — 93225 XTRNL ECG REC<48 HRS REC: CPT

## 2025-06-12 ENCOUNTER — HOSPITAL ENCOUNTER (OUTPATIENT)
Dept: ULTRASOUND IMAGING | Age: 40
Discharge: HOME | End: 2025-06-12
Payer: COMMERCIAL

## 2025-06-12 DIAGNOSIS — E04.1 THYROID NODULE: ICD-10-CM

## 2025-06-12 PROCEDURE — 76536 US EXAM OF HEAD AND NECK: CPT

## 2025-06-20 LAB
ACQUISITION DURATION: NORMAL S
AVERAGE HEART RATE: 87 BPM
HOOKUP DATE: NORMAL
HOOKUP TIME: NORMAL
MAX HEART RATE TIME/DATE: NORMAL
MAX HEART RATE: 140 BPM
MIN HEART RATE TIME/DATE: NORMAL
MIN HEART RATE: 66 BPM
NUMBER OF QRS COMPLEXES: NORMAL
NUMBER OF SUPRAVENTRICULAR COUPLETS: 0
NUMBER OF SUPRAVENTRICULAR ECTOPICS: 3
NUMBER OF SUPRAVENTRICULAR ISOLATED BEATS: 1
NUMBER OF VENTRICULAR BIGEMINAL CYCLES: 0
NUMBER OF VENTRICULAR COUPLETS: 0
NUMBER OF VENTRICULAR ECTOPICS: 306

## 2025-06-25 NOTE — PATIENT INSTRUCTIONS
You may receive a survey regarding the care you received during your visit. We encourage you to complete and return your survey, as your input is valuable to us. We hope you will choose us in the future for your healthcare needs. Thank you!    Your Certified Medical Assistant today: Levon  Thank you for coming to our office! It was a pleasure to serve you.

## 2025-06-27 ENCOUNTER — OFFICE VISIT (OUTPATIENT)
Dept: CARDIOLOGY CLINIC | Age: 40
End: 2025-06-27
Payer: COMMERCIAL

## 2025-06-27 VITALS
HEIGHT: 60 IN | HEART RATE: 92 BPM | DIASTOLIC BLOOD PRESSURE: 60 MMHG | SYSTOLIC BLOOD PRESSURE: 116 MMHG | BODY MASS INDEX: 30.94 KG/M2 | WEIGHT: 157.6 LBS

## 2025-06-27 DIAGNOSIS — R00.2 PALPITATION: Primary | ICD-10-CM

## 2025-06-27 PROCEDURE — 99214 OFFICE O/P EST MOD 30 MIN: CPT | Performed by: INTERNAL MEDICINE

## 2025-06-27 NOTE — PROGRESS NOTES
[facility] [place] [facility]  [facility] CARDIOLOGY  [address]  [address]  [place] [address]  Dept: [phone]  Dept Fax: [phone]  Loc: [phone]    Visit Date: 6/27/2025    Chief Complaint   Patient presents with    Follow-Up from Hospital     [facility] LBBB    Palpitations       HPI:   Ms. Queen is a 40 y.o. female  who presented for:  History of Present Illness  The patient presents for evaluation of palpitations.    She was hospitalized approximately 4 weeks ago, during which she experienced intermittent palpitations and mild discomfort. In the emergency room, she was administered aspirin and nitroglycerin, along with an intravenous dose of Lopressor. A CTA of the chest was performed to exclude the possibility of pulmonary embolism or perforation, as she had undergone an EGD earlier that day. Prior to her discharge, an echocardiogram was conducted to investigate potential causes of a left bundle branch block. A 20-hour Holter monitor was also utilized. She reports experiencing a few episodes of palpitations while wearing the Holter monitor post-discharge. She acknowledges occasional stress.      Current Outpatient Medications:     Vitamin D3 125 MCG (5000 UT) TABS tablet, Take 1 tablet by mouth daily, Disp: , Rfl:     sertraline (ZOLOFT) 100 MG tablet, Take 1 tablet by mouth daily, Disp: 90 tablet, Rfl: 3    hydrOXYzine HCl (ATARAX) 25 MG tablet, Take 1 tablet by mouth daily as needed for Anxiety, Disp: 90 tablet, Rfl: 1    topiramate (TOPAMAX) 50 MG tablet, Take 1 tablet by mouth 2 times daily, Disp: 180 tablet, Rfl: 3    rimegepant sulfate 75 MG TBDP, Take 1 tablet by mouth daily as needed (migraine), Disp: 8 tablet, Rfl: 11    traMADol (ULTRAM) 50 MG tablet, Take 1 tablet by mouth every 8 hours as needed for Pain for up to 180 days. Max Daily Amount: 150 mg, Disp: 90 tablet, Rfl: 1    promethazine (PHENERGAN) 25 MG tablet, Take 1 tablet by mouth every 6 hours as needed

## (undated) DEVICE — GLOVE ORANGE PI 7   MSG9070

## (undated) DEVICE — JELLY,LUBE,STERILE,FLIP TOP,TUBE,2-OZ: Brand: MEDLINE

## (undated) DEVICE — SUTURE VCRL SZ 3-0 L18IN ABSRB VLT L26MM SH 1/2 CIR J774D

## (undated) DEVICE — BLADE CLIPPER GEN PURP NS

## (undated) DEVICE — SUTURE ABSRB MFIL VLT CT 3-0 - 27IN PDS II Z338H

## (undated) DEVICE — CONTAINER,SPECIMEN,PNEU TUBE,4OZ,OR STRL: Brand: MEDLINE

## (undated) DEVICE — SYSTEM COLL W/ TISS TRAP INCLUDE COLL CANSTR LID SET OF

## (undated) DEVICE — SOLUTION SCRB 4OZ 4% CHG H2O AIDED FOR PREOPERATIVE SKIN

## (undated) DEVICE — COVER ARMBRD W13XL28.5IN IMPERV BLU FOR OP RM

## (undated) DEVICE — SET COLL TBNG L6FT DIA3/8IN W/ INTEGR SWVL HNDL SLIP RNG M

## (undated) DEVICE — GLOVE SURG SZ 65 THK91MIL LTX FREE SYN POLYISOPRENE

## (undated) DEVICE — SUTURE VCRL SZ 4-0 L27IN ABSRB UD L60MM KS STR REV CUT NDL J662H

## (undated) DEVICE — SPINAL NEEDLE TRAY: Brand: MEDLINE INDUSTRIES, INC.

## (undated) DEVICE — DRESSING ANITMICROBIAL FOAM OPTIFOAM POSTOP STRP 35 X 10 IN

## (undated) DEVICE — CATHETER,FOLEY,100%SILICONE,16FR,10ML,LF: Brand: MEDLINE

## (undated) DEVICE — GARMENT,MEDLINE,DVT,INT,CALF,MED, GEN2: Brand: MEDLINE

## (undated) DEVICE — SUTURE VCRL SZ 1 L36IN ABSRB UD CTX L48MM 1/2 CIR J977H

## (undated) DEVICE — Z DISCONTINUED USE 2659133 CANNULA VAC DIA8MM CRV SEMI RIG W/ ROUNDED TIP TAPR END

## (undated) DEVICE — SPONGE GZ W4XL4IN COT 12 PLY TYP VII WVN C FLD DSGN

## (undated) DEVICE — APPLICATOR MEDICATED 26 CC SOLUTION HI LT ORNG CHLORAPREP

## (undated) DEVICE — CATHETER,URETHRAL,REDRUBBER,STRL,16FR: Brand: MEDLINE

## (undated) DEVICE — SUTURE VCRL SZ 0 L36IN ABSRB VLT L36MM CT-1 1/2 CIR J346H